# Patient Record
Sex: FEMALE | Race: WHITE | Employment: OTHER | URBAN - METROPOLITAN AREA
[De-identification: names, ages, dates, MRNs, and addresses within clinical notes are randomized per-mention and may not be internally consistent; named-entity substitution may affect disease eponyms.]

---

## 2017-02-17 ENCOUNTER — CARE COORDINATION (OUTPATIENT)
Dept: ONCOLOGY | Facility: CLINIC | Age: 62
End: 2017-02-17

## 2017-02-17 NOTE — PROGRESS NOTES
"Yancy called about the pain in her left leg. She states today it feels good. Not bothering her. She feels that it was from over compensating from \"babying\" her right knee that is bad due to arthritis. Gets steroid injections to this knee, is in need of another one. Denies any pain, swelling or redness at this time. Started using her cane again last night which she feels also helped. She will call over the weekend if the symptoms reoccur. Has the resource number to call if needed.   "

## 2017-02-21 ENCOUNTER — TRANSFERRED RECORDS (OUTPATIENT)
Dept: HEALTH INFORMATION MANAGEMENT | Facility: CLINIC | Age: 62
End: 2017-02-21

## 2017-02-22 ENCOUNTER — TELEPHONE (OUTPATIENT)
Dept: ONCOLOGY | Facility: CLINIC | Age: 62
End: 2017-02-22

## 2017-02-22 NOTE — TELEPHONE ENCOUNTER
Clinic Action Needed:Yes, please call patient  Reason for Call:Patient calling reporting she found a lump under left arm yesterday. Reporting area was scanned yesterday at [facility].  Patient is requesting that Dr Voss review the results.     Routed to:Dr Voss Nurse Evans Kilgore, RN  [facility]

## 2017-02-28 ENCOUNTER — TELEPHONE (OUTPATIENT)
Dept: ONCOLOGY | Facility: CLINIC | Age: 62
End: 2017-02-28

## 2017-02-28 ENCOUNTER — ONCOLOGY VISIT (OUTPATIENT)
Dept: ONCOLOGY | Facility: CLINIC | Age: 62
End: 2017-02-28
Attending: PHYSICIAN ASSISTANT
Payer: COMMERCIAL

## 2017-02-28 ENCOUNTER — APPOINTMENT (OUTPATIENT)
Dept: LAB | Facility: CLINIC | Age: 62
End: 2017-02-28
Attending: INTERNAL MEDICINE
Payer: COMMERCIAL

## 2017-02-28 VITALS
BODY MASS INDEX: 33.95 KG/M2 | SYSTOLIC BLOOD PRESSURE: 154 MMHG | DIASTOLIC BLOOD PRESSURE: 90 MMHG | OXYGEN SATURATION: 95 % | HEIGHT: 63 IN | WEIGHT: 191.6 LBS | RESPIRATION RATE: 16 BRPM | TEMPERATURE: 98.5 F | HEART RATE: 95 BPM

## 2017-02-28 DIAGNOSIS — L73.9 FOLLICULITIS: Primary | ICD-10-CM

## 2017-02-28 DIAGNOSIS — C85.90 NHL (NON-HODGKIN'S LYMPHOMA) (H): ICD-10-CM

## 2017-02-28 DIAGNOSIS — C85.90 NHL (NON-HODGKIN'S LYMPHOMA) (H): Primary | ICD-10-CM

## 2017-02-28 LAB
ALBUMIN SERPL-MCNC: 4.2 G/DL (ref 3.4–5)
ALP SERPL-CCNC: 104 U/L (ref 40–150)
ALT SERPL W P-5'-P-CCNC: 37 U/L (ref 0–50)
ANION GAP SERPL CALCULATED.3IONS-SCNC: 9 MMOL/L (ref 3–14)
AST SERPL W P-5'-P-CCNC: 27 U/L (ref 0–45)
BASOPHILS # BLD AUTO: 0 10E9/L (ref 0–0.2)
BASOPHILS NFR BLD AUTO: 0.5 %
BILIRUB SERPL-MCNC: 0.7 MG/DL (ref 0.2–1.3)
BUN SERPL-MCNC: 12 MG/DL (ref 7–30)
CALCIUM SERPL-MCNC: 9.2 MG/DL (ref 8.5–10.1)
CHLORIDE SERPL-SCNC: 100 MMOL/L (ref 94–109)
CO2 SERPL-SCNC: 28 MMOL/L (ref 20–32)
CREAT SERPL-MCNC: 0.72 MG/DL (ref 0.52–1.04)
DIFFERENTIAL METHOD BLD: NORMAL
EOSINOPHIL # BLD AUTO: 0.1 10E9/L (ref 0–0.7)
EOSINOPHIL NFR BLD AUTO: 1 %
ERYTHROCYTE [DISTWIDTH] IN BLOOD BY AUTOMATED COUNT: 12.3 % (ref 10–15)
GFR SERPL CREATININE-BSD FRML MDRD: 81 ML/MIN/1.7M2
GLUCOSE SERPL-MCNC: 113 MG/DL (ref 70–99)
GRAM STN SPEC: NORMAL
HCT VFR BLD AUTO: 40.9 % (ref 35–47)
HGB BLD-MCNC: 13.8 G/DL (ref 11.7–15.7)
IMM GRANULOCYTES # BLD: 0 10E9/L (ref 0–0.4)
IMM GRANULOCYTES NFR BLD: 0.3 %
LDH SERPL L TO P-CCNC: 202 U/L (ref 81–234)
LYMPHOCYTES # BLD AUTO: 1.4 10E9/L (ref 0.8–5.3)
LYMPHOCYTES NFR BLD AUTO: 22.2 %
Lab: NORMAL
MCH RBC QN AUTO: 30.9 PG (ref 26.5–33)
MCHC RBC AUTO-ENTMCNC: 33.7 G/DL (ref 31.5–36.5)
MCV RBC AUTO: 92 FL (ref 78–100)
MICRO REPORT STATUS: NORMAL
MONOCYTES # BLD AUTO: 0.4 10E9/L (ref 0–1.3)
MONOCYTES NFR BLD AUTO: 5.8 %
NEUTROPHILS # BLD AUTO: 4.4 10E9/L (ref 1.6–8.3)
NEUTROPHILS NFR BLD AUTO: 70.2 %
NRBC # BLD AUTO: 0 10*3/UL
NRBC BLD AUTO-RTO: 0 /100
PLATELET # BLD AUTO: 187 10E9/L (ref 150–450)
POTASSIUM SERPL-SCNC: 3.8 MMOL/L (ref 3.4–5.3)
PROT SERPL-MCNC: 7.7 G/DL (ref 6.8–8.8)
RBC # BLD AUTO: 4.46 10E12/L (ref 3.8–5.2)
SODIUM SERPL-SCNC: 137 MMOL/L (ref 133–144)
SPECIMEN SOURCE: NORMAL
WBC # BLD AUTO: 6.3 10E9/L (ref 4–11)

## 2017-02-28 PROCEDURE — 87205 SMEAR GRAM STAIN: CPT | Performed by: PHYSICIAN ASSISTANT

## 2017-02-28 PROCEDURE — 80053 COMPREHEN METABOLIC PANEL: CPT | Performed by: PHYSICIAN ASSISTANT

## 2017-02-28 PROCEDURE — 36415 COLL VENOUS BLD VENIPUNCTURE: CPT | Performed by: PHYSICIAN ASSISTANT

## 2017-02-28 PROCEDURE — 85025 COMPLETE CBC W/AUTO DIFF WBC: CPT | Performed by: PHYSICIAN ASSISTANT

## 2017-02-28 PROCEDURE — 87077 CULTURE AEROBIC IDENTIFY: CPT | Performed by: PHYSICIAN ASSISTANT

## 2017-02-28 PROCEDURE — 87070 CULTURE OTHR SPECIMN AEROBIC: CPT | Performed by: PHYSICIAN ASSISTANT

## 2017-02-28 PROCEDURE — 87186 SC STD MICRODIL/AGAR DIL: CPT | Performed by: PHYSICIAN ASSISTANT

## 2017-02-28 PROCEDURE — 99212 OFFICE O/P EST SF 10 MIN: CPT | Mod: ZF

## 2017-02-28 PROCEDURE — 83615 LACTATE (LD) (LDH) ENZYME: CPT | Performed by: PHYSICIAN ASSISTANT

## 2017-02-28 PROCEDURE — 99214 OFFICE O/P EST MOD 30 MIN: CPT | Mod: ZP | Performed by: PHYSICIAN ASSISTANT

## 2017-02-28 RX ORDER — HYDROCHLOROTHIAZIDE 12.5 MG/1
12.5 CAPSULE ORAL
COMMUNITY
Start: 2017-01-14

## 2017-02-28 RX ORDER — CIMETIDINE 800 MG
800 TABLET ORAL
COMMUNITY
Start: 2015-12-07

## 2017-02-28 RX ORDER — SULFAMETHOXAZOLE/TRIMETHOPRIM 800-160 MG
1 TABLET ORAL 2 TIMES DAILY
Qty: 14 TABLET | Refills: 0 | Status: SHIPPED | OUTPATIENT
Start: 2017-02-28 | End: 2017-03-07

## 2017-02-28 RX ORDER — ROSUVASTATIN CALCIUM 10 MG/1
10 TABLET, COATED ORAL
COMMUNITY
Start: 2017-01-14

## 2017-02-28 RX ORDER — TRAMADOL HYDROCHLORIDE 50 MG/1
50 TABLET ORAL
COMMUNITY
Start: 2016-12-30

## 2017-02-28 RX ORDER — LEVOTHYROXINE SODIUM 112 UG/1
112 TABLET ORAL
COMMUNITY
Start: 2017-01-14

## 2017-02-28 RX ORDER — AMOXICILLIN 500 MG/1
500 CAPSULE ORAL
COMMUNITY
Start: 2014-08-12

## 2017-02-28 RX ORDER — CYANOCOBALAMIN 1000 UG/ML
1000 INJECTION, SOLUTION INTRAMUSCULAR; SUBCUTANEOUS
COMMUNITY
Start: 2015-03-23

## 2017-02-28 ASSESSMENT — PAIN SCALES - GENERAL: PAINLEVEL: NO PAIN (0)

## 2017-02-28 NOTE — PROGRESS NOTES
Chief Complaint   Patient presents with     Blood Draw     lab draw in right arm, vitals taken      Estefania Beyer CMA

## 2017-02-28 NOTE — NURSING NOTE
"Yancy Jacosb is a 61 year old female who presents for:  Chief Complaint   Patient presents with     Blood Draw     lab draw in right arm, vitals taken      Oncology Clinic Visit     return patient for follow up visit related to NHL (non-Hodgkin's lymphoma) (H)        Initial Vitals:  /90 (BP Location: Right arm, Patient Position: Chair, Cuff Size: Adult Large)  Pulse 95  Temp 98.5  F (36.9  C) (Oral)  Resp 16  Ht 1.6 m (5' 3\")  Wt 86.9 kg (191 lb 9.6 oz)  SpO2 95%  BMI 33.94 kg/m2 Estimated body mass index is 33.94 kg/(m^2) as calculated from the following:    Height as of this encounter: 1.6 m (5' 3\").    Weight as of this encounter: 86.9 kg (191 lb 9.6 oz).. Body surface area is 1.97 meters squared. BP completed using cuff size: NA (Not Taken)  No Pain (0) No LMP recorded. Allergies and medications reviewed.     Medications: Medication refills not needed today.  Pharmacy name entered into EPIC: [facility] #[id number] - [place] [address]    Comments: patient denied pain/discomfort. Patient is very anxious.     5 minutes for nursing intake (face to face time)   Mary Stanford CMA        "

## 2017-02-28 NOTE — PROGRESS NOTES
Oncology/Hematology Visit Note  Feb 28, 2017    Reason for Visit: follow up of follicular lymphoma, evaluate left axillary mass    History of Present Illness: Yancy Jacobs is a 61 year old female with follicular lymphoma.  She was treated with 4 weekly doses of Rituxan in June 2016. Subsequent imaging in July 2016 showed marked interval decrease with near resolution of FDG uptake in the multiple hypermetabolic osseous lesions. CT CAP on 11/8/16 showed no evidence of recurrent disease.  Please see previous notes for further details on the patient's history. She comes in today for evaluation of a left axillary mass.    Interval History:  Patient reports that she noticed a left axillary lump on 2/19. She saw her PCP and reports that she had a black head removed. Due to persistent bump, she had an ultrasound, which she notes had a small lymph node. She was picking at it yesterday and noted pus. She reports a history of MRSA and was treated for an infection in May 2016 with clindamycin. She denies any fevers, chills, night sweats, other lumps or bumps. She has had a poor appetite for the past 2 days due to concern about the potential for lymphoma recurrence. She denies other concerns.     Current Outpatient Prescriptions   Medication Sig Dispense Refill     cyanocobalamin (VITAMIN B12) 1000 MCG/ML injection Inject 1,000 mcg into the muscle       hydrochlorothiazide (MICROZIDE) 12.5 MG capsule Take 12.5 mg by mouth       levothyroxine (SYNTHROID/LEVOTHROID) 112 MCG tablet Take 112 mcg by mouth       rosuvastatin (CRESTOR) 10 MG tablet Take 10 mg by mouth       traMADol (ULTRAM) 50 MG tablet Take 50 mg by mouth       sulfamethoxazole-trimethoprim (BACTRIM DS/SEPTRA DS) 800-160 MG per tablet Take 1 tablet by mouth 2 times daily for 7 days 14 tablet 0     amoxicillin (AMOXIL) 500 MG capsule 500 mg Reported on 2/28/2017       cimetidine (TAGAMET) 800 MG tablet Take 800 mg by mouth Reported on 2/28/2017        "[DISCONTINUED] hydrochlorothiazide (MICROZIDE) 12.5 MG capsule Take 12.5 mg by mouth       Physical Examination:  General: The patient is a pleasant female in no acute distress. She appears moderately anxious.  /90 (BP Location: Right arm, Patient Position: Chair, Cuff Size: Adult Large)  Pulse 95  Temp 98.5  F (36.9  C) (Oral)  Resp 16  Ht 1.6 m (5' 3\")  Wt 86.9 kg (191 lb 9.6 oz)  SpO2 95%  BMI 33.94 kg/m2  Wt Readings from Last 10 Encounters:   02/28/17 86.9 kg (191 lb 9.6 oz)   11/17/16 83.1 kg (183 lb 4.8 oz)   06/24/16 81.1 kg (178 lb 12.8 oz)   06/17/16 82 kg (180 lb 11.2 oz)   06/10/16 80.9 kg (178 lb 4.8 oz)   06/03/16 81 kg (178 lb 9.6 oz)   05/11/16 81.2 kg (179 lb 1.6 oz)   05/05/16 81.6 kg (180 lb)   04/22/16 82 kg (180 lb 12.4 oz)   04/04/16 80.7 kg (177 lb 14.4 oz)   HEENT: EOMI. Sclerae are anicteric.   Lymph: Neck is supple with no lymphadenopathy in the cervical or supraclavicular areas. 6 mm left axillary lump palpated, mildly tender with associated mild amount of white drainage. No other left axillary adenopathy. No right axillary adenopathy palpable. No palpable inguinal adenopathy.   Heart: Regular rate and rhythm.   Lungs: Clear to auscultation bilaterally.   Abdomen: Bowel sounds present, soft, nontender with no palpable hepatosplenomegaly or masses.   Extremities: No lower extremity edema noted bilaterally.   Neuro: Cranial nerves II through XII are grossly intact.  Skin: As above. No rashes, petechiae, or bruising noted on exposed skin.    Laboratory Data:  Results for orders placed or performed in visit on 02/28/17 (from the past 24 hour(s))   Comprehensive metabolic panel   Result Value Ref Range    Sodium 137 133 - 144 mmol/L    Potassium 3.8 3.4 - 5.3 mmol/L    Chloride 100 94 - 109 mmol/L    Carbon Dioxide 28 20 - 32 mmol/L    Anion Gap 9 3 - 14 mmol/L    Glucose 113 (H) 70 - 99 mg/dL    Urea Nitrogen 12 7 - 30 mg/dL    Creatinine 0.72 0.52 - 1.04 mg/dL    GFR Estimate 81 >60 " mL/min/1.7m2    GFR Estimate If Black >90   GFR Calc   >60 mL/min/1.7m2    Calcium 9.2 8.5 - 10.1 mg/dL    Bilirubin Total 0.7 0.2 - 1.3 mg/dL    Albumin 4.2 3.4 - 5.0 g/dL    Protein Total 7.7 6.8 - 8.8 g/dL    Alkaline Phosphatase 104 40 - 150 U/L    ALT 37 0 - 50 U/L    AST 27 0 - 45 U/L   Lactate Dehydrogenase   Result Value Ref Range    Lactate Dehydrogenase 202 81 - 234 U/L   *CBC with platelets differential   Result Value Ref Range    WBC 6.3 4.0 - 11.0 10e9/L    RBC Count 4.46 3.8 - 5.2 10e12/L    Hemoglobin 13.8 11.7 - 15.7 g/dL    Hematocrit 40.9 35.0 - 47.0 %    MCV 92 78 - 100 fl    MCH 30.9 26.5 - 33.0 pg    MCHC 33.7 31.5 - 36.5 g/dL    RDW 12.3 10.0 - 15.0 %    Platelet Count 187 150 - 450 10e9/L    Diff Method Automated Method     % Neutrophils 70.2 %    % Lymphocytes 22.2 %    % Monocytes 5.8 %    % Eosinophils 1.0 %    % Basophils 0.5 %    % Immature Granulocytes 0.3 %    Nucleated RBCs 0 0 /100    Absolute Neutrophil 4.4 1.6 - 8.3 10e9/L    Absolute Lymphocytes 1.4 0.8 - 5.3 10e9/L    Absolute Monocytes 0.4 0.0 - 1.3 10e9/L    Absolute Eosinophils 0.1 0.0 - 0.7 10e9/L    Absolute Basophils 0.0 0.0 - 0.2 10e9/L    Abs Immature Granulocytes 0.0 0 - 0.4 10e9/L    Absolute Nucleated RBC 0.0      Assessment and Plan:  1. Folliculitis. Lesion in left axilla is most consistent with a folliculitis. Small amount of drainage was collected for a gram stain and culture. Given history of MRSA, will treat with Bactrim, as she felt she did not tolerate the clindamycin well.     2. Follicular lymphoma. No evidence of recurrence on H&P. Will f/u with Dr. Voss as scheduled the end of May with a CT CAP. She will call sooner for concerns.     Elissa Villarreal PA-C  [facility]  [address] [place] [address] [phone]    Addendum: Outside left axillary u/s on 2/21/17 shows the following: 4 x 3 x 2 mm hypoechoic lesion.

## 2017-02-28 NOTE — MR AVS SNAPSHOT
After Visit Summary   2/28/2017    Yancy Jacobs    MRN: [id number]           Patient Information     Date Of Birth          1955        Visit Information        Provider Department      2/28/2017 11:10 AM Elissa Villarreal PA-C [facility]        Today's Diagnoses     Folliculitis    -  1    NHL (non-Hodgkin's lymphoma) (H)           Follow-ups after your visit        Your next 10 appointments already scheduled     May 23, 2017  9:20 AM CDT   (Arrive by 9:05 AM)   CT FEMUR BILATERAL W CONTRAST with UCCT2   [facility] CT ([facility])    [address]  1st Floor  [facility] [address] [phone]           Please bring any scans or X-rays taken at other hospitals, if similar tests were done. Also bring a list of your medicines, including vitamins, minerals and over-the-counter drugs. It is safest to leave personal items at home.  Be sure to tell your doctor:   If you have any allergies.   If there s any chance you are pregnant.   If you are breastfeeding.   If you have any special needs.  You will have contrast for this exam. To prepare:   Do not eat or drink for 2 hours before your exam. If you need to take medicine, you may take it with small sips of water. (We may ask you to take liquid medicine as well.)   The day before your exam, drink extra fluids at least six 8-ounce glasses (unless your doctor tells you to restrict your fluids).  Patients over 70 or patients with diabetes or kidney problems:   If you haven t had a blood test (creatinine test) within the last 30 days, go to your clinic or Diagnostic Imaging Department for this test.  If you have diabetes:   If your kidney function is normal, continue taking your metformin (Avandamet, Glucophage, Glucovance, Metaglip) on the day of your exam.   If your kidney function is abnormal, wait 48 hours before restarting this medicine.  Please wear loose clothing, such as a  sweat suit or jogging clothes. Avoid snaps, zippers and other metal. We may ask you to undress and put on a hospital gown.  If you have any questions, please call the Imaging Department where you will have your exam.            May 23, 2017  9:40 AM CDT   (Arrive by 9:25 AM)   CT CHEST/ABDOMEN/PELVIS W CONTRAST with UCCT2   [facility] CT ([facility])    [address]  1st Floor  [facility] [address] [phone]           Please bring any scans or X-rays taken at other hospitals, if similar tests were done. Also bring a list of your medicines, including vitamins, minerals and over-the-counter drugs. It is safest to leave personal items at home.  Be sure to tell your doctor:   If you have any allergies.   If there s any chance you are pregnant.   If you are breastfeeding.   If you have any special needs.  You may have contrast for this exam. To prepare:   Do not eat or drink for 2 hours before your exam. If you need to take medicine, you may take it with small sips of water. (We may ask you to take liquid medicine as well.)   The day before your exam, drink extra fluids at least six 8-ounce glasses (unless your doctor tells you to restrict your fluids).  Patients over 70 or patients with diabetes or kidney problems:   If you haven t had a blood test (creatinine test) within the last 30 days, go to your clinic or Diagnostic Imaging Department for this test.  If you have diabetes:   If your kidney function is normal, continue taking your metformin (Avandamet, Glucophage, Glucovance, Metaglip) on the day of your exam.   If your kidney function is abnormal, wait 48 hours before restarting this medicine.  You will have oral contrast for this exam:   You will drink the contrast at home. Get this from your clinic or Diagnostic Imaging Department. Please follow the directions given.  Please wear loose clothing, such as a sweat suit or jogging clothes. Avoid snaps, zippers  "and other metal. We may ask you to undress and put on a hospital gown.  If you have any questions, please call the Imaging Department where you will have your exam.            May 25, 2017  9:30 AM CDT   RETURN ONC with Jorge Voss MD   [facility] ([facility])    [address]  2nd Floor  [facility] [address] [phone]              Who to contact     If you have questions or need follow up information about today's clinic visit or your schedule please contact [facility] directly at [phone].  Normal or non-critical lab and imaging results will be communicated to you by MyChart, letter or phone within 4 business days after the clinic has received the results. If you do not hear from us within 7 days, please contact the clinic through [employer]fer[employer] or phone. If you have a critical or abnormal lab result, we will notify you by phone as soon as possible.  Submit refill requests through Evino or call your pharmacy and they will forward the refill request to us. Please allow 3 business days for your refill to be completed.          Additional Information About Your Visit        Evino Information     Evino gives you secure access to your electronic health record. If you see a primary care provider, you can also send messages to your care team and make appointments. If you have questions, please call your primary care clinic.  If you do not have a primary care provider, please call [phone] and they will assist you.        Care EveryWhere ID     This is your Care EveryWhere ID. This could be used by other organizations to access your [facility] medical records  [id number]        Your Vitals Were     Pulse Temperature Respirations Height Pulse Oximetry BMI (Body Mass Index)    95 98.5  F (36.9  C) (Oral) 16 1.6 m (5' 3\") 95% 33.94 kg/m2       Blood Pressure from Last 3 Encounters:   02/28/17 154/90   11/17/16 152/81 "   08/11/16 148/89    Weight from Last 3 Encounters:   02/28/17 86.9 kg (191 lb 9.6 oz)   11/17/16 83.1 kg (183 lb 4.8 oz)   06/24/16 81.1 kg (178 lb 12.8 oz)              We Performed the Following     *CBC with platelets differential     Comprehensive metabolic panel     Gram stain     Lactate Dehydrogenase     Wound Culture Aerobic Bacterial          Today's Medication Changes          These changes are accurate as of: 2/28/17 12:10 PM.  If you have any questions, ask your nurse or doctor.               Start taking these medicines.        Dose/Directions    sulfamethoxazole-trimethoprim 800-160 MG per tablet   Commonly known as:  BACTRIM DS/SEPTRA DS   Used for:  Folliculitis   Started by:  Elissa Villarreal PA-C        Dose:  1 tablet   Take 1 tablet by mouth 2 times daily for 7 days   Quantity:  14 tablet   Refills:  0         These medicines have changed or have updated prescriptions.        Dose/Directions    amoxicillin 500 MG capsule   Commonly known as:  AMOXIL   This may have changed:  Another medication with the same name was removed. Continue taking this medication, and follow the directions you see here.   Changed by:  Elissa Villarreal PA-C        Dose:  500 mg   500 mg Reported on 2/28/2017   Refills:  0       cimetidine 800 MG tablet   Commonly known as:  TAGAMET   This may have changed:  Another medication with the same name was removed. Continue taking this medication, and follow the directions you see here.   Changed by:  Elissa Villarreal PA-C        Dose:  800 mg   Take 800 mg by mouth Reported on 2/28/2017   Refills:  0       cyanocobalamin 1000 MCG/ML injection   Commonly known as:  VITAMIN B12   This may have changed:  Another medication with the same name was removed. Continue taking this medication, and follow the directions you see here.   Changed by:  Elissa Villarreal PA-C        Dose:  1000 mcg   Inject 1,000 mcg into the muscle   Refills:  0       hydrochlorothiazide 12.5  MG capsule   Commonly known as:  MICROZIDE   This may have changed:  Another medication with the same name was removed. Continue taking this medication, and follow the directions you see here.   Changed by:  Elissa Villarreal PA-C        Dose:  12.5 mg   Take 12.5 mg by mouth   Refills:  0       levothyroxine 112 MCG tablet   Commonly known as:  SYNTHROID/LEVOTHROID   This may have changed:  Another medication with the same name was removed. Continue taking this medication, and follow the directions you see here.   Changed by:  Elissa Villarreal PA-C        Dose:  112 mcg   Take 112 mcg by mouth   Refills:  0       rosuvastatin 10 MG tablet   Commonly known as:  CRESTOR   This may have changed:  Another medication with the same name was removed. Continue taking this medication, and follow the directions you see here.   Changed by:  Elissa Villarreal PA-C        Dose:  10 mg   Take 10 mg by mouth   Refills:  0       traMADol 50 MG tablet   Commonly known as:  ULTRAM   This may have changed:  Another medication with the same name was removed. Continue taking this medication, and follow the directions you see here.   Changed by:  Elissa Villarreal PA-C        Dose:  50 mg   Take 50 mg by mouth   Refills:  0            Where to get your medicines      These medications were sent to [facility] #[id number] - [place], [place] - [address]  [address] [place] [id number]    Hours:  [facility] [place] - numbers unchanged   9/8/03  Phone:  [phone]     sulfamethoxazole-trimethoprim 800-160 MG per tablet                Primary Care Provider Office Phone # Fax #    Misti Starr [phone]       [facility] [address]  [facility] [address]        Thank you!     Thank you for choosing [facility]  for your care. Our goal is always to provide you with excellent care. Hearing back from our patients is one way we can continue to improve our services.  Please take a few minutes to complete the written survey that you may receive in the mail after your visit with us. Thank you!             Your Updated Medication List - Protect others around you: Learn how to safely use, store and throw away your medicines at www.disposemymeds.org.          This list is accurate as of: 2/28/17 12:10 PM.  Always use your most recent med list.                   Brand Name Dispense Instructions for use    amoxicillin 500 MG capsule    AMOXIL     500 mg Reported on 2/28/2017       cimetidine 800 MG tablet    TAGAMET     Take 800 mg by mouth Reported on 2/28/2017       cyanocobalamin 1000 MCG/ML injection    VITAMIN B12     Inject 1,000 mcg into the muscle       hydrochlorothiazide 12.5 MG capsule    MICROZIDE     Take 12.5 mg by mouth       levothyroxine 112 MCG tablet    SYNTHROID/LEVOTHROID     Take 112 mcg by mouth       rosuvastatin 10 MG tablet    CRESTOR     Take 10 mg by mouth       sulfamethoxazole-trimethoprim 800-160 MG per tablet    BACTRIM DS/SEPTRA DS    14 tablet    Take 1 tablet by mouth 2 times daily for 7 days       traMADol 50 MG tablet    ULTRAM     Take 50 mg by mouth

## 2017-03-01 ENCOUNTER — MYC MEDICAL ADVICE (OUTPATIENT)
Dept: ONCOLOGY | Facility: CLINIC | Age: 62
End: 2017-03-01

## 2017-03-02 LAB
BACTERIA SPEC CULT: ABNORMAL
Lab: ABNORMAL
MICRO REPORT STATUS: ABNORMAL
MICROORGANISM SPEC CULT: ABNORMAL
SPECIMEN SOURCE: ABNORMAL

## 2017-05-23 DIAGNOSIS — K57.92 ACUTE DIVERTICULITIS: Primary | ICD-10-CM

## 2017-05-23 RX ORDER — CIPROFLOXACIN 500 MG/1
500 TABLET, FILM COATED ORAL 2 TIMES DAILY
Qty: 14 TABLET | Refills: 0 | Status: SHIPPED | OUTPATIENT
Start: 2017-05-23 | End: 2017-12-21

## 2017-05-23 RX ORDER — METRONIDAZOLE 500 MG/1
500 TABLET ORAL 3 TIMES DAILY
Qty: 21 TABLET | Refills: 0 | Status: SHIPPED | OUTPATIENT
Start: 2017-05-23 | End: 2017-12-21

## 2017-05-25 ENCOUNTER — OFFICE VISIT (OUTPATIENT)
Dept: TRANSPLANT | Facility: CLINIC | Age: 62
End: 2017-05-25
Attending: INTERNAL MEDICINE
Payer: COMMERCIAL

## 2017-05-25 VITALS
OXYGEN SATURATION: 98 % | SYSTOLIC BLOOD PRESSURE: 149 MMHG | DIASTOLIC BLOOD PRESSURE: 99 MMHG | HEART RATE: 67 BPM | TEMPERATURE: 98 F | RESPIRATION RATE: 18 BRPM

## 2017-05-25 DIAGNOSIS — C82.99 FOLLICULAR LYMPHOMA OF EXTRANODAL SITE EXCLUDING SPLEEN AND OTHER SOLID ORGANS, UNSPECIFIED FOLLICULAR LYMPHOMA TYPE (H): Primary | ICD-10-CM

## 2017-05-25 DIAGNOSIS — C82.99 FOLLICULAR LYMPHOMA OF EXTRANODAL SITE EXCLUDING SPLEEN AND OTHER SOLID ORGANS, UNSPECIFIED FOLLICULAR LYMPHOMA TYPE (H): ICD-10-CM

## 2017-05-25 LAB
ALBUMIN SERPL-MCNC: 3.6 G/DL (ref 3.4–5)
ALP SERPL-CCNC: 96 U/L (ref 40–150)
ALT SERPL W P-5'-P-CCNC: 36 U/L (ref 0–50)
ANION GAP SERPL CALCULATED.3IONS-SCNC: 10 MMOL/L (ref 3–14)
AST SERPL W P-5'-P-CCNC: 34 U/L (ref 0–45)
BASOPHILS # BLD AUTO: 0 10E9/L (ref 0–0.2)
BASOPHILS NFR BLD AUTO: 0.4 %
BILIRUB SERPL-MCNC: 0.6 MG/DL (ref 0.2–1.3)
BUN SERPL-MCNC: 8 MG/DL (ref 7–30)
CALCIUM SERPL-MCNC: 9.2 MG/DL (ref 8.5–10.1)
CHLORIDE SERPL-SCNC: 104 MMOL/L (ref 94–109)
CO2 SERPL-SCNC: 28 MMOL/L (ref 20–32)
CREAT SERPL-MCNC: 0.68 MG/DL (ref 0.52–1.04)
DIFFERENTIAL METHOD BLD: ABNORMAL
EOSINOPHIL # BLD AUTO: 0.1 10E9/L (ref 0–0.7)
EOSINOPHIL NFR BLD AUTO: 1.7 %
ERYTHROCYTE [DISTWIDTH] IN BLOOD BY AUTOMATED COUNT: 12.5 % (ref 10–15)
GFR SERPL CREATININE-BSD FRML MDRD: 87 ML/MIN/1.7M2
GLUCOSE SERPL-MCNC: 104 MG/DL (ref 70–99)
HCT VFR BLD AUTO: 35.8 % (ref 35–47)
HGB BLD-MCNC: 12 G/DL (ref 11.7–15.7)
IMM GRANULOCYTES # BLD: 0 10E9/L (ref 0–0.4)
IMM GRANULOCYTES NFR BLD: 0.4 %
LDH SERPL L TO P-CCNC: 190 U/L (ref 81–234)
LYMPHOCYTES # BLD AUTO: 1.5 10E9/L (ref 0.8–5.3)
LYMPHOCYTES NFR BLD AUTO: 28.1 %
MCH RBC QN AUTO: 31.7 PG (ref 26.5–33)
MCHC RBC AUTO-ENTMCNC: 33.5 G/DL (ref 31.5–36.5)
MCV RBC AUTO: 95 FL (ref 78–100)
MONOCYTES # BLD AUTO: 0.4 10E9/L (ref 0–1.3)
MONOCYTES NFR BLD AUTO: 8.2 %
NEUTROPHILS # BLD AUTO: 3.3 10E9/L (ref 1.6–8.3)
NEUTROPHILS NFR BLD AUTO: 61.2 %
NRBC # BLD AUTO: 0 10*3/UL
NRBC BLD AUTO-RTO: 0 /100
PLATELET # BLD AUTO: 174 10E9/L (ref 150–450)
POTASSIUM SERPL-SCNC: 4 MMOL/L (ref 3.4–5.3)
PROT SERPL-MCNC: 6.8 G/DL (ref 6.8–8.8)
RBC # BLD AUTO: 3.79 10E12/L (ref 3.8–5.2)
SODIUM SERPL-SCNC: 142 MMOL/L (ref 133–144)
WBC # BLD AUTO: 5.3 10E9/L (ref 4–11)

## 2017-05-25 PROCEDURE — 80053 COMPREHEN METABOLIC PANEL: CPT | Performed by: INTERNAL MEDICINE

## 2017-05-25 PROCEDURE — 36415 COLL VENOUS BLD VENIPUNCTURE: CPT | Performed by: INTERNAL MEDICINE

## 2017-05-25 PROCEDURE — 85025 COMPLETE CBC W/AUTO DIFF WBC: CPT | Performed by: INTERNAL MEDICINE

## 2017-05-25 PROCEDURE — 99212 OFFICE O/P EST SF 10 MIN: CPT

## 2017-05-25 PROCEDURE — 83615 LACTATE (LD) (LDH) ENZYME: CPT | Performed by: INTERNAL MEDICINE

## 2017-05-25 NOTE — NURSING NOTE
"Oncology Rooming Note    May 25, 2017 9:44 AM   Yancy Jacobs is a 62 year old female who presents for:    Chief Complaint   Patient presents with     Oncology Clinic Visit     Pt with NHL--here for a MD visit     Initial Vitals: BP (!) 149/99 (BP Location: Right arm, Patient Position: Right side, Cuff Size: Adult Large)  Pulse 67  Temp 98  F (36.7  C) (Oral)  Resp 18  SpO2 98% Estimated body mass index is 33.94 kg/(m^2) as calculated from the following:    Height as of 2/28/17: 1.6 m (5' 3\").    Weight as of 2/28/17: 86.9 kg (191 lb 9.6 oz). There is no height or weight on file to calculate BSA.  Data Unavailable Comment: Data Unavailable   No LMP recorded.  Allergies reviewed: Yes  Medications reviewed: Yes    Medications: Medication refills not needed today.  Pharmacy name entered into EPIC: [facility] #[id number] - [address]    Clinical concerns: Pt had a episode of Acute Diverticulitis this last week. She hd a fever and went in to her MD at home. Then called Dr. Voss who put her on some abx.      6 minutes for nursing intake (face to face time)     Anita Reynolds MA              "

## 2017-05-25 NOTE — PROGRESS NOTES
REASON FOR VISIT:  Followup for history of follicular lymphoma with bony involvement.      HISTORY OF PRESENT ILLNESS/REVIEW OF SYSTEMS:  Ms. 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  She underwent diagnostic PET/CT scan in 05/2016, which demonstrated multiple axial and appendicular hypermetabolic marrow lesions consistent with her underlying lymphoma.  The patient was treated with 4 weekly infusions of rituximab and has remained progression-free with no constitutional symptoms or any concerns for disease progression.  She has been having chronic pain in her knees associated with arthritis for which she has been following up with the Orthopedic team with periodic injections of steroids into the knee with temporary relief.  Her recent clinical course was complicated by acute diverticulitis with high-grade fevers and left lower quadrant abdominal pain.  The patient was seen and evaluated by her primary care physician.  However, no antibiotics were prescribed.  I reviewed with the patient the results of her recent CT imaging that was obtained for the purposes of surveillance for her underlying lymphoma, which demonstrated acute diverticulitis.  Given these imaging findings and her clinical symptoms, I recommended initiation of empiric antibiotic therapy with Cipro and Flagyl of 1 week duration.      The patient has been taking these antibiotics for the past couple of days.  I also called and informed the patient on the results of her imaging evaluation and my recommendation to consider antibiotic therapy for her acute diverticulitis.      She reports overall improvement with no ongoing pain in her abdomen.  No fevers, chills or drenching night sweats.  She continues to work full-time.  Apart from  the pain in her knee, she denies any other symptoms such as weight loss, drenching night sweats and she is not aware about any lumps throughout her body.      The rest of 12 points of ROS were reviewed and found to be negative, unless as mentioned above.      Pertinent points of her interval medical history were reviewed during this visit and addressed as outlined below.  I also reviewed and reconciled her meds.      PHYSICAL EXAMINATION:   VITAL SIGNS:  Reviewed in Epic and found to be notable for a slight elevation in both systolic and diastolic blood pressures.   GENERAL:  Not in acute distress, alert and oriented, well-nourished and hydrated.   HEENT:  Moist mucous membranes with no ulcerations or thrush.  Pupils are equally round and reactive to light.  No eythema or jaundice.   NECK:  No palpable masses.   PULMONARY:  Clear to auscultation bilaterally.   CARDIOVASCULAR:  Regular rate and rhythm, no murmurs, rubs or gallops.   LYMPHATICS:  No palpable lymph nodes in the axilla, armpits, groin.   ABDOMEN:  Soft, nontender, nondistended, no palpable hepatosplenomegaly.  Audible bowel sounds and no pain in the lower quadrants, even on deep palpation.   EXTREMITIES:  No lower extremity edema.   SKIN:  No rashes.   NEUROLOGIC:  Grossly nonfocal with normal gait and balance.      LABORATORY DATA:  Reviewed in Epic and overall stable compared to last visit.  Specifically, WBC is 5.3, hemoglobin 12.0 and platelets 174.      ASSESSMENT AND PLAN:  This is a very pleasant 62-year-old female patient with a prior history of germinal center type B-cell non-Hodgkin lymphoma most consistent with follicular lymphoma with involvement of the bony skeleton treated with 4 weekly infusions of rituximab, who has remained in remission with no evidence of disease progression, presenting for her followup evaluation.      Lymphoma:  We reviewed with the patient her interval progress and result of her recent CT scans, which demonstrated  no evidence of disease progression (including no new lymphadenopathy and no new bony lesions).  There has been also no signs of disease progression based on her physical examination and lab findings so far.  I was certainly concerned with her development of acute diverticulitis with high-grade fevers and abdominal pain; however, the patient has been now treated for this successfully with Cipro and Flagyl for a total course of a week.  The patient will follow up with her primary care physician with the rest of her health maintenance issues and I will see her back in 3 months with repeat labs and physical examination in regards to her lymphoma.  Multiple questions were asked and answered during this visit.  I recommend the patient to pursue a fiber-rich diet, and she voiced understanding and agreement with this plan.      I spent over 50% of close to a 40-minute encounter in counseling and care coordination, reviewing her Interval medical history and formulating ongoing plan of care as outlined above.       Jorge Voss MD PhD  Hematology, Oncology and Transplantation  [facility]    ------------------------------------------------------------------------------------------------------------------  This note was created with the use of a speech recognition software occasionally resulting in unintended misspelling errors despite my best efforts to detect and correct those.

## 2017-05-25 NOTE — LETTER
5/25/2017       RE: Yancy Jacobs  [address] [address]     Dear Colleague,    Thank you for referring your patient, Yancy Jacobs, to the [facility] BLOOD AND MARROW TRANSPLANT at [facility]. Please see a copy of my visit note below.    REASON FOR VISIT:  Followup for history of follicular lymphoma with bony involvement.      HISTORY OF PRESENT ILLNESS/REVIEW OF SYSTEMS:  Ms. 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  She underwent diagnostic PET/CT scan in 05/2016, which demonstrated multiple axial and appendicular hypermetabolic marrow lesions consistent with her underlying lymphoma.  The patient was treated with 4 weekly infusions of rituximab and has remained progression-free with no constitutional symptoms or any concerns for disease progression.  She has been having chronic pain in her knees associated with arthritis for which she has been following up with the Orthopedic team with periodic injections of steroids into the knee with temporary relief.  Her recent clinical course was complicated by acute diverticulitis with high-grade fevers and left lower quadrant abdominal pain.  The patient was seen and evaluated by her primary care physician.  However, no antibiotics were prescribed.  I reviewed with the patient the results of her recent CT imaging that was obtained for the purposes of surveillance for her underlying lymphoma, which demonstrated acute diverticulitis.  Given these imaging findings and her clinical symptoms, I recommended initiation of empiric antibiotic therapy with Cipro and Flagyl of 1 week duration.      The patient has been taking these antibiotics for the past couple of days.  I also called and informed  the patient on the results of her imaging evaluation and my recommendation to consider antibiotic therapy for her acute diverticulitis.      She reports overall improvement with no ongoing pain in her abdomen.  No fevers, chills or drenching night sweats.  She continues to work full-time.  Apart from the pain in her knee, she denies any other symptoms such as weight loss, drenching night sweats and she is not aware about any lumps throughout her body.      The rest of 12 points of ROS were reviewed and found to be negative, unless as mentioned above.      Pertinent points of her interval medical history were reviewed during this visit and addressed as outlined below.  I also reviewed and reconciled her meds.      PHYSICAL EXAMINATION:   VITAL SIGNS:  Reviewed in Epic and found to be notable for a slight elevation in both systolic and diastolic blood pressures.   GENERAL:  Not in acute distress, alert and oriented, well-nourished and hydrated.   HEENT:  Moist mucous membranes with no ulcerations or thrush.  Pupils are equally round and reactive to light.  No eythema or jaundice.   NECK:  No palpable masses.   PULMONARY:  Clear to auscultation bilaterally.   CARDIOVASCULAR:  Regular rate and rhythm, no murmurs, rubs or gallops.   LYMPHATICS:  No palpable lymph nodes in the axilla, armpits, groin.   ABDOMEN:  Soft, nontender, nondistended, no palpable hepatosplenomegaly.  Audible bowel sounds and no pain in the lower quadrants, even on deep palpation.   EXTREMITIES:  No lower extremity edema.   SKIN:  No rashes.   NEUROLOGIC:  Grossly nonfocal with normal gait and balance.      LABORATORY DATA:  Reviewed in Epic and overall stable compared to last visit.  Specifically, WBC is 5.3, hemoglobin 12.0 and platelets 174.      ASSESSMENT AND PLAN:  This is a very pleasant 62-year-old female patient with a prior history of germinal center type B-cell non-Hodgkin lymphoma most consistent with follicular lymphoma with  involvement of the bony skeleton treated with 4 weekly infusions of rituximab, who has remained in remission with no evidence of disease progression, presenting for her followup evaluation.      Lymphoma:  We reviewed with the patient her interval progress and result of her recent CT scans, which demonstrated no evidence of disease progression (including no new lymphadenopathy and no new bony lesions).  There has been also no signs of disease progression based on her physical examination and lab findings so far.  I was certainly concerned with her development of acute diverticulitis with high-grade fevers and abdominal pain; however, the patient has been now treated for this successfully with Cipro and Flagyl for a total course of a week.  The patient will follow up with her primary care physician with the rest of her health maintenance issues and I will see her back in 3 months with repeat labs and physical examination in regards to her lymphoma.  Multiple questions were asked and answered during this visit.  I recommend the patient to pursue a fiber-rich diet, and she voiced understanding and agreement with this plan.      I spent over 50% of close to a 40-minute encounter in counseling and care coordination, reviewing her Interval medical history and formulating ongoing plan of care as outlined above.       Jorge Voss MD PhD  Hematology, Oncology and Transplantation  [facility]    ------------------------------------------------------------------------------------------------------------------  This note was created with the use of a speech recognition software occasionally resulting in unintended misspelling errors despite my best efforts to detect and correct those.       Again, thank you for allowing me to participate in the care of your patient.      Sincerely,    Jorge Voss MD

## 2017-05-25 NOTE — MR AVS SNAPSHOT
After Visit Summary   5/25/2017    Yancy Jacobs    MRN: [id number]           Patient Information     Date Of Birth          1955        Visit Information        Provider Department      5/25/2017 9:30 AM Jorge Voss MD [facility] Blood and Marrow Transplant        Today's Diagnoses     Follicular lymphoma of extranodal site excluding spleen and other solid organs, unspecified follicular lymphoma type (H)    -  1          [facility] ([facility])  [address] [place] [id number]  Phone: [phone]  Clinic Hours:   Monday-Thursday: 7am to 7pm   Friday: 7am to 5:30pm   Weekends and holidays:    8am to noon (in general)  If your fever is 100.5  or greater,   call the clinic.  After hours call the   hospital at [phone] or   [phone]. Ask for the BMT   fellow on-call            Follow-ups after your visit        Your next 10 appointments already scheduled     Aug 24, 2017  9:00 AM CDT   [facility] with  [facility]   [facility] ([facility])    [address] [place] [id number] [phone]            Aug 24, 2017  9:30 AM CDT   RETURN ONC with Jorge Voss MD   [facility] Blood and Marrow Transplant ([facility])    [address] [place] [id number] [phone]              Who to contact     If you have questions or need follow up information about today's clinic visit or your schedule please contact [facility] BLOOD AND MARROW TRANSPLANT directly at [phone].  Normal or non-critical lab and imaging results will be communicated to you by MyChart, letter or phone within 4 business days after the clinic has received the results. If you do not hear from us within 7 days, please contact the clinic through MyChart or phone. If you have a critical or abnormal lab result, we will notify you by phone as soon as possible.  Submit  refill requests through [employer] or call your pharmacy and they will forward the refill request to us. Please allow 3 business days for your refill to be completed.          Additional Information About Your Visit        [employer] Information     [employer] gives you secure access to your electronic health record. If you see a primary care provider, you can also send messages to your care team and make appointments. If you have questions, please call your primary care clinic.  If you do not have a primary care provider, please call [phone] and they will assist you.        Care EveryWhere ID     This is your Care EveryWhere ID. This could be used by other organizations to access your [facility] medical records  [id number]        Your Vitals Were     Pulse Temperature Respirations Pulse Oximetry          67 98  F (36.7  C) (Oral) 18 98%         Blood Pressure from Last 3 Encounters:   05/25/17 (!) 149/99   02/28/17 154/90   11/17/16 152/81    Weight from Last 3 Encounters:   02/28/17 86.9 kg (191 lb 9.6 oz)   11/17/16 83.1 kg (183 lb 4.8 oz)   06/24/16 81.1 kg (178 lb 12.8 oz)              Today, you had the following     No orders found for display       Recent Review Flowsheet Data     BMT Recent Results Latest Ref Rng & Units 6/24/2016 7/22/2016 8/11/2016 11/8/2016 11/8/2016 2/28/2017 5/25/2017    WBC 4.0 - 11.0 10e9/L 5.9 - 6.3 - 5.9 6.3 5.3    Hemoglobin 11.7 - 15.7 g/dL 12.4 - 12.5 - 13.2 13.8 12.0    Platelet Count 150 - 450 10e9/L 184 - 171 - 189 187 174    Neutrophils (Absolute) 1.6 - 8.3 10e9/L 3.2 - 3.7 - 3.4 4.4 3.3    Sodium 133 - 144 mmol/L - - 142 - 141 137 142    Potassium 3.4 - 5.3 mmol/L - - 3.9 - 4.2 3.8 4.0    Chloride 94 - 109 mmol/L - - 104 - 104 100 104    Glucose 70 - 99 mg/dL - 103(H) 94 - 106(H) 113(H) 104(H)    Urea Nitrogen 7 - 30 mg/dL - - 13 - 12 12 8    Creatinine 0.52 - 1.04 mg/dL - 0.8 0.73 0.68 0.7 0.72 0.68    Calcium (Total) 8.5 - 10.1 mg/dL - - 9.5 - 9.0 9.2 9.2    Protein (Total)  6.8 - 8.8 g/dL - - 7.4 - 7.3 7.7 6.8    Albumin 3.4 - 5.0 g/dL - - 4.3 - 3.8 4.2 3.6    Alkaline Phosphatase 40 - 150 U/L - - 100 - 102 104 96    AST 0 - 45 U/L - - 25 - 20 27 34    ALT 0 - 50 U/L - - 33 - 34 37 36    MCV 78 - 100 fl 91 - 91 - 93 92 95               Primary Care Provider Office Phone # Fax #    Misti Starr, RONNY [phone]       [facility] [place] [address] [place] [address]        Thank you!     Thank you for choosing [facility] BLOOD AND MARROW TRANSPLANT  for your care. Our goal is always to provide you with excellent care. Hearing back from our patients is one way we can continue to improve our services. Please take a few minutes to complete the written survey that you may receive in the mail after your visit with us. Thank you!             Your Updated Medication List - Protect others around you: Learn how to safely use, store and throw away your medicines at www.disposemymeds.org.          This list is accurate as of: 5/25/17 11:59 PM.  Always use your most recent med list.                   Brand Name Dispense Instructions for use    amoxicillin 500 MG capsule    AMOXIL     500 mg Reported on 2/28/2017       cimetidine 800 MG tablet    TAGAMET     Take 800 mg by mouth Reported on 2/28/2017       ciprofloxacin 500 MG tablet    CIPRO    14 tablet    Take 1 tablet (500 mg) by mouth 2 times daily       cyanocobalamin 1000 MCG/ML injection    VITAMIN B12     Inject 1,000 mcg into the muscle       hydrochlorothiazide 12.5 MG capsule    MICROZIDE     Take 12.5 mg by mouth       levothyroxine 112 MCG tablet    SYNTHROID/LEVOTHROID     Take 112 mcg by mouth       metroNIDAZOLE 500 MG tablet    FLAGYL    21 tablet    Take 1 tablet (500 mg) by mouth 3 times daily       rosuvastatin 10 MG tablet    CRESTOR     Take 10 mg by mouth       traMADol 50 MG tablet    ULTRAM     Take 50 mg by mouth

## 2017-08-24 ENCOUNTER — OFFICE VISIT (OUTPATIENT)
Dept: TRANSPLANT | Facility: CLINIC | Age: 62
End: 2017-08-24
Attending: INTERNAL MEDICINE
Payer: COMMERCIAL

## 2017-08-24 ENCOUNTER — APPOINTMENT (OUTPATIENT)
Dept: LAB | Facility: CLINIC | Age: 62
End: 2017-08-24
Attending: INTERNAL MEDICINE
Payer: COMMERCIAL

## 2017-08-24 VITALS
RESPIRATION RATE: 16 BRPM | HEART RATE: 78 BPM | HEIGHT: 63 IN | DIASTOLIC BLOOD PRESSURE: 87 MMHG | OXYGEN SATURATION: 98 % | SYSTOLIC BLOOD PRESSURE: 153 MMHG | TEMPERATURE: 97.7 F

## 2017-08-24 DIAGNOSIS — C82.99 FOLLICULAR LYMPHOMA OF EXTRANODAL SITE EXCLUDING SPLEEN AND OTHER SOLID ORGANS, UNSPECIFIED FOLLICULAR LYMPHOMA TYPE (H): ICD-10-CM

## 2017-08-24 LAB
ALBUMIN SERPL-MCNC: 3.9 G/DL (ref 3.4–5)
ALP SERPL-CCNC: 111 U/L (ref 40–150)
ALT SERPL W P-5'-P-CCNC: 33 U/L (ref 0–50)
ANION GAP SERPL CALCULATED.3IONS-SCNC: 7 MMOL/L (ref 3–14)
AST SERPL W P-5'-P-CCNC: 18 U/L (ref 0–45)
BASOPHILS # BLD AUTO: 0 10E9/L (ref 0–0.2)
BASOPHILS NFR BLD AUTO: 0.5 %
BILIRUB SERPL-MCNC: 0.5 MG/DL (ref 0.2–1.3)
BUN SERPL-MCNC: 14 MG/DL (ref 7–30)
CALCIUM SERPL-MCNC: 8.9 MG/DL (ref 8.5–10.1)
CHLORIDE SERPL-SCNC: 104 MMOL/L (ref 94–109)
CO2 SERPL-SCNC: 29 MMOL/L (ref 20–32)
CREAT SERPL-MCNC: 0.84 MG/DL (ref 0.52–1.04)
DIFFERENTIAL METHOD BLD: NORMAL
EOSINOPHIL # BLD AUTO: 0.1 10E9/L (ref 0–0.7)
EOSINOPHIL NFR BLD AUTO: 1.9 %
ERYTHROCYTE [DISTWIDTH] IN BLOOD BY AUTOMATED COUNT: 13 % (ref 10–15)
GFR SERPL CREATININE-BSD FRML MDRD: 68 ML/MIN/1.7M2
GLUCOSE SERPL-MCNC: 94 MG/DL (ref 70–99)
HCT VFR BLD AUTO: 40.5 % (ref 35–47)
HGB BLD-MCNC: 13.4 G/DL (ref 11.7–15.7)
IMM GRANULOCYTES # BLD: 0 10E9/L (ref 0–0.4)
IMM GRANULOCYTES NFR BLD: 0.3 %
LDH SERPL L TO P-CCNC: 171 U/L (ref 81–234)
LYMPHOCYTES # BLD AUTO: 2 10E9/L (ref 0.8–5.3)
LYMPHOCYTES NFR BLD AUTO: 31.4 %
MCH RBC QN AUTO: 30.9 PG (ref 26.5–33)
MCHC RBC AUTO-ENTMCNC: 33.1 G/DL (ref 31.5–36.5)
MCV RBC AUTO: 94 FL (ref 78–100)
MONOCYTES # BLD AUTO: 0.5 10E9/L (ref 0–1.3)
MONOCYTES NFR BLD AUTO: 7.4 %
NEUTROPHILS # BLD AUTO: 3.7 10E9/L (ref 1.6–8.3)
NEUTROPHILS NFR BLD AUTO: 58.5 %
NRBC # BLD AUTO: 0 10*3/UL
NRBC BLD AUTO-RTO: 0 /100
PLATELET # BLD AUTO: 168 10E9/L (ref 150–450)
POTASSIUM SERPL-SCNC: 3.9 MMOL/L (ref 3.4–5.3)
PROT SERPL-MCNC: 7.5 G/DL (ref 6.8–8.8)
RBC # BLD AUTO: 4.33 10E12/L (ref 3.8–5.2)
SODIUM SERPL-SCNC: 140 MMOL/L (ref 133–144)
WBC # BLD AUTO: 6.3 10E9/L (ref 4–11)

## 2017-08-24 PROCEDURE — 85025 COMPLETE CBC W/AUTO DIFF WBC: CPT | Performed by: INTERNAL MEDICINE

## 2017-08-24 PROCEDURE — 99212 OFFICE O/P EST SF 10 MIN: CPT

## 2017-08-24 PROCEDURE — 36415 COLL VENOUS BLD VENIPUNCTURE: CPT

## 2017-08-24 PROCEDURE — 83615 LACTATE (LD) (LDH) ENZYME: CPT | Performed by: INTERNAL MEDICINE

## 2017-08-24 PROCEDURE — 80053 COMPREHEN METABOLIC PANEL: CPT | Performed by: INTERNAL MEDICINE

## 2017-08-24 ASSESSMENT — PAIN SCALES - GENERAL: PAINLEVEL: NO PAIN (0)

## 2017-08-24 NOTE — MR AVS SNAPSHOT
After Visit Summary   8/24/2017    Yancy Jacobs    MRN: [id number]           Patient Information     Date Of Birth          1955        Visit Information        Provider Department      8/24/2017 9:30 AM Jorge Voss MD [facility] Blood and Marrow Transplant        Today's Diagnoses     Follicular lymphoma of extranodal site excluding spleen and other solid organs, unspecified follicular lymphoma type (H)              [facility] ([facility])  [address] [place] [address]  Phone: [phone]  Clinic Hours:   Monday-Thursday:7am to 7pm   Friday: 7am to 5pm   Weekends and holidays:    8am to noon (in general)  If your fever is 100.5  or greater,   call the clinic.  After hours call the   hospital at [phone] or   [phone]. Ask for the BMT   fellow on-call            Follow-ups after your visit        Your next 10 appointments already scheduled     Dec 12, 2017  9:30 AM CST   [facility] with  [facility]   [facility] Draw ([facility])    [address] [place] [id number] [phone]            Dec 12, 2017 10:00 AM CST   (Arrive by 9:45 AM)   CT FEMUR BILATERAL W CONTRAST with UCCT2   [facility] Imaging [place] [facility] ([facility])    [address] [place] [id number] [phone]           Please bring any scans or X-rays taken at other hospitals, if similar tests were done. Also bring a list of your medicines, including vitamins, minerals and over-the-counter drugs. It is safest to leave personal items at home.  Be sure to tell your doctor:   If you have any allergies.   If there s any chance you are pregnant.   If you are breastfeeding.   If you have any special needs.  You will have contrast for this exam. To prepare:   Do not eat or drink for 2 hours before your exam. If you need to take medicine, you may take it with small sips of  water. (We may ask you to take liquid medicine as well.)   The day before your exam, drink extra fluids at least six 8-ounce glasses (unless your doctor tells you to restrict your fluids).  Patients over 70 or patients with diabetes or kidney problems:   If you haven t had a blood test (creatinine test) within the last 30 days, go to your clinic or Diagnostic Imaging Department for this test.  If you have diabetes:   If your kidney function is normal, continue taking your metformin (Avandamet, Glucophage, Glucovance, Metaglip) on the day of your exam.   If your kidney function is abnormal, wait 48 hours before restarting this medicine.  Please wear loose clothing, such as a sweat suit or jogging clothes. Avoid snaps, zippers and other metal. We may ask you to undress and put on a hospital gown.  If you have any questions, please call the Imaging Department where you will have your exam.            Dec 12, 2017 10:20 AM CST   (Arrive by 10:05 AM)   CT CHEST/ABDOMEN/PELVIS W CONTRAST with UCCT2   [facility] CT ([facility])    [address] [address] [phone]           Please bring any scans or X-rays taken at other hospitals, if similar tests were done. Also bring a list of your medicines, including vitamins, minerals and over-the-counter drugs. It is safest to leave personal items at home.  Be sure to tell your doctor:   If you have any allergies.   If there s any chance you are pregnant.   If you are breastfeeding.   If you have any special needs.  You may have contrast for this exam. To prepare:   Do not eat or drink for 2 hours before your exam. If you need to take medicine, you may take it with small sips of water. (We may ask you to take liquid medicine as well.)   The day before your exam, drink extra fluids at least six 8-ounce glasses (unless your doctor tells you to restrict your fluids).  Patients over 70 or patients with  diabetes or kidney problems:   If you haven t had a blood test (creatinine test) within the last 30 days, go to your clinic or Diagnostic Imaging Department for this test.  If you have diabetes:   If your kidney function is normal, continue taking your metformin (Avandamet, Glucophage, Glucovance, Metaglip) on the day of your exam.   If your kidney function is abnormal, wait 48 hours before restarting this medicine.  You will have oral contrast for this exam:   You will drink the contrast at home. Get this from your clinic or Diagnostic Imaging Department. Please follow the directions given.  Please wear loose clothing, such as a sweat suit or jogging clothes. Avoid snaps, zippers and other metal. We may ask you to undress and put on a hospital gown.  If you have any questions, please call the Imaging Department where you will have your exam.            Dec 21, 2017  9:30 AM CST   RETURN ONC with Jorge Voss MD   [facility] Blood and Marrow Transplant ([facility])    [address] [place] [id number] [phone]              Future tests that were ordered for you today     Open Future Orders        Priority Expected Expires Ordered    CT Chest/Abdomen/Pelvis w Contrast Routine 12/12/2017 1/8/2018 8/24/2017    CT Femur bilateral w Contrast Routine 12/12/2017 1/24/2018 8/24/2017            Who to contact     If you have questions or need follow up information about today's clinic visit or your schedule please contact [facility] BLOOD AND MARROW TRANSPLANT directly at [phone].  Normal or non-critical lab and imaging results will be communicated to you by MyChart, letter or phone within 4 business days after the clinic has received the results. If you do not hear from us within 7 days, please contact the clinic through MyChart or phone. If you have a critical or abnormal lab result, we will notify you by phone as soon as possible.  Submit refill requests  "through [employer] or call your pharmacy and they will forward the refill request to us. Please allow 3 business days for your refill to be completed.          Additional Information About Your Visit        the [facility] Information     [employer] gives you secure access to your electronic health record. If you see a primary care provider, you can also send messages to your care team and make appointments. If you have questions, please call your primary care clinic.  If you do not have a primary care provider, please call [phone] and they will assist you.        Care EveryWhere ID     This is your Care EveryWhere ID. This could be used by other organizations to access your [facility] medical records  [id number]        Your Vitals Were     Pulse Temperature Respirations Height Pulse Oximetry       78 97.7  F (36.5  C) (Oral) 16 1.6 m (5' 2.99\") 98%        Blood Pressure from Last 3 Encounters:   08/24/17 153/87   05/25/17 (!) 149/99   02/28/17 154/90    Weight from Last 3 Encounters:   02/28/17 86.9 kg (191 lb 9.6 oz)   11/17/16 83.1 kg (183 lb 4.8 oz)   06/24/16 81.1 kg (178 lb 12.8 oz)              We Performed the Following     CBC with platelets differential     Comprehensive metabolic panel     Lactate Dehydrogenase        Recent Review Flowsheet Data     BMT Recent Results Latest Ref Rng & Units 7/22/2016 8/11/2016 11/8/2016 11/8/2016 2/28/2017 5/25/2017 8/24/2017    WBC 4.0 - 11.0 10e9/L - 6.3 - 5.9 6.3 5.3 6.3    Hemoglobin 11.7 - 15.7 g/dL - 12.5 - 13.2 13.8 12.0 13.4    Platelet Count 150 - 450 10e9/L - 171 - 189 187 174 168    Neutrophils (Absolute) 1.6 - 8.3 10e9/L - 3.7 - 3.4 4.4 3.3 3.7    Sodium 133 - 144 mmol/L - 142 - 141 137 142 140    Potassium 3.4 - 5.3 mmol/L - 3.9 - 4.2 3.8 4.0 3.9    Chloride 94 - 109 mmol/L - 104 - 104 100 104 104    Glucose 70 - 99 mg/dL 103(H) 94 - 106(H) 113(H) 104(H) 94    Urea Nitrogen 7 - 30 mg/dL - 13 - 12 12 8 14    Creatinine 0.52 - 1.04 mg/dL 0.8 0.73 0.68 0.7 0.72 0.68 " 0.84    Calcium (Total) 8.5 - 10.1 mg/dL - 9.5 - 9.0 9.2 9.2 8.9    Protein (Total) 6.8 - 8.8 g/dL - 7.4 - 7.3 7.7 6.8 7.5    Albumin 3.4 - 5.0 g/dL - 4.3 - 3.8 4.2 3.6 3.9    Alkaline Phosphatase 40 - 150 U/L - 100 - 102 104 96 111    AST 0 - 45 U/L - 25 - 20 27 34 18    ALT 0 - 50 U/L - 33 - 34 37 36 33    MCV 78 - 100 fl - 91 - 93 92 95 94               Primary Care Provider Office Phone # Fax #    Misti Matty, RONNY [phone]       [facility] [address] [address] [place] [address]        Equal Access to Services     JUSTINO PINO : Hadii angie seth Soemanuel, waaxda luqadaha, qaybta kaalmada victor manuel, zack field . So [facility] [phone].    ATENCIÓN: Si habla español, tiene a parker disposición servicios gratuitos de asistencia lingüística. Brianna al [phone].    We comply with applicable federal civil rights laws and [place] laws. We do not discriminate on the basis of race, color, national origin, age, disability sex, sexual orientation or gender identity.            Thank you!     Thank you for choosing [facility] BLOOD AND MARROW TRANSPLANT  for your care. Our goal is always to provide you with excellent care. Hearing back from our patients is one way we can continue to improve our services. Please take a few minutes to complete the written survey that you may receive in the mail after your visit with us. Thank you!             Your Updated Medication List - Protect others around you: Learn how to safely use, store and throw away your medicines at www.disposemymeds.org.          This list is accurate as of: 8/24/17 11:44 AM.  Always use your most recent med list.                   Brand Name Dispense Instructions for use Diagnosis    amoxicillin 500 MG capsule    AMOXIL     500 mg Reported on 2/28/2017        cimetidine 800 MG tablet    TAGAMET     Take 800 mg by mouth Reported on 2/28/2017        ciprofloxacin 500 MG tablet    CIPRO    14  tablet    Take 1 tablet (500 mg) by mouth 2 times daily    Acute diverticulitis       cyanocobalamin 1000 MCG/ML injection    VITAMIN B12     Inject 1,000 mcg into the muscle        hydrochlorothiazide 12.5 MG capsule    MICROZIDE     Take 12.5 mg by mouth        levothyroxine 112 MCG tablet    SYNTHROID/LEVOTHROID     Take 112 mcg by mouth        metroNIDAZOLE 500 MG tablet    FLAGYL    21 tablet    Take 1 tablet (500 mg) by mouth 3 times daily    Acute diverticulitis       rosuvastatin 10 MG tablet    CRESTOR     Take 10 mg by mouth        traMADol 50 MG tablet    ULTRAM     Take 50 mg by mouth

## 2017-08-24 NOTE — NURSING NOTE
"Oncology Rooming Note    August 24, 2017 9:28 AM   Yancy Jacobs is a 62 year old female who presents for:    Chief Complaint   Patient presents with     Blood Draw     Labs drawn from right arm in clinic by MA     Initial Vitals: /87  Pulse 78  Temp 97.7  F (36.5  C) (Oral)  Resp 16  Ht 1.6 m (5' 2.99\")  SpO2 98% Estimated body mass index is 33.94 kg/(m^2) as calculated from the following:    Height as of 2/28/17: 1.6 m (5' 3\").    Weight as of 2/28/17: 86.9 kg (191 lb 9.6 oz). There is no height or weight on file to calculate BSA.  No Pain (0) Comment: Data Unavailable   No LMP recorded.  Allergies reviewed: Yes  Medications reviewed: Yes    Medications: Medication refills not needed today.  Pharmacy name entered into EPIC: [facility] #[id number] - [address]    Clinical concerns: none     6 minutes for nursing intake (face to face time)     Anita Reynolds MA              "

## 2017-08-24 NOTE — PROGRESS NOTES
"REASON FOR VISIT:  Followup for history of follicular lymphoma with bony involvement.      HISTORY OF PRESENT ILLNESS/REVIEW OF SYSTEMS:  Ms. 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  She underwent diagnostic PET/CT scan in 05/2016, which demonstrated multiple axial and appendicular hypermetabolic marrow lesions consistent with her underlying lymphoma.  The patient was treated with 4 weekly infusions of rituximab and has remained progression-free with no constitutional symptoms or any concerns for disease progression.    Since her last visit with me she has continued to have occasional chronic pain in her knees associated with arthritis for which she has been following up with the Orthopedic team with periodic injections of steroids into the knee with temporary relief.  Her prior clinical course complicated by acute diverticulitis with high-grade fevers and left lower quadrant abdominal pain had completely resolved with abx.     She reported no recent fevers, chills or drenching night sweats.  She continues to work full-time.  she denies any weight loss, drenching night sweats and she is not aware about any lumps throughout her body. No new bony aches or pains.     The rest of 12 points of ROS were reviewed and found to be negative, unless as mentioned above.      Pertinent points of her interval medical history were reviewed during this visit and addressed as outlined below.  I also reviewed and reconciled her meds.      PHYSICAL EXAMINATION:   /87  Pulse 78  Temp 97.7  F (36.5  C) (Oral)  Resp 16  Ht 1.6 m (5' 2.99\")  SpO2 98%  GENERAL:  Not in acute distress, alert and oriented, well-nourished and hydrated.   HEENT:  Moist mucous membranes with no ulcerations or thrush.  Pupils " are equally round and reactive to light.  No eythema or jaundice.   NECK:  No palpable masses.   PULMONARY:  Clear to auscultation bilaterally.   CARDIOVASCULAR:  Regular rate and rhythm, no murmurs, rubs or gallops.   LYMPHATICS:  No palpable lymph nodes in the axilla, armpits, groin.   ABDOMEN:  Soft, nontender, nondistended, no palpable hepatosplenomegaly.  Audible bowel sounds and no pain in the lower quadrants, even on deep palpation.   EXTREMITIES:  No lower extremity edema.   SKIN:  No rashes.   MS: no painful bony prominences  NEUROLOGIC:  Grossly nonfocal with normal gait and balance.      LABORATORY DATA:  Reviewed in Epic  Hematology Studies   Recent Labs   Lab Test  08/24/17   0916  05/25/17   1030  02/28/17   1054  11/08/16   0846   WBC  6.3  5.3  6.3  5.9   ANEU  3.7  3.3  4.4  3.4   ALYM  2.0  1.5  1.4  1.8   JAYDEN  0.5  0.4  0.4  0.4   AEOS  0.1  0.1  0.1  0.1   HGB  13.4  12.0  13.8  13.2   HCT  40.5  35.8  40.9  39.6   PLT  168  174  187  189     LDH: wnl  CMP: wnl    ASSESSMENT AND PLAN:  This is a very pleasant 62-year-old female patient with a prior history of germinal center type B-cell non-Hodgkin lymphoma most consistent with follicular lymphoma with involvement of the bony skeleton treated with 4 weekly infusions of rituximab, who has remained in remission with no evidence of disease progression, presenting for her followup evaluation.      --Lymphoma:  We reviewed with the patient her interval progress and she appears to have no signs of disease progression based on her physical examination and lab findings. We will plan on seeing her in 3 mos with repeat imaging , labs and exam.   --Heme: no cytopenias or needs for transfusions  --ID: informed the pt about need to get flu vaccination in the Fall    I spent over 50% of close to a 25-minute of face to face encounter in counseling and care coordination, reviewing her Interval medical history and formulating ongoing plan of care as outlined  above.       Jorge Voss MD PhD  Hematology, Oncology and Transplantation  [facility]    ------------------------------------------------------------------------------------------------------------------  This note was created with the use of a speech recognition software occasionally resulting in unintended misspelling errors despite my best efforts to detect and correct those.

## 2017-08-24 NOTE — NURSING NOTE
Chief Complaint   Patient presents with     Blood Draw     Labs drawn from right arm in clinic by MA     Pt tolerated well  Mihaela Marcial MA

## 2017-12-12 DIAGNOSIS — C82.99 FOLLICULAR LYMPHOMA OF EXTRANODAL SITE EXCLUDING SPLEEN AND OTHER SOLID ORGANS, UNSPECIFIED FOLLICULAR LYMPHOMA TYPE (H): ICD-10-CM

## 2017-12-12 LAB
ALBUMIN SERPL-MCNC: 3.6 G/DL (ref 3.4–5)
ALP SERPL-CCNC: 123 U/L (ref 40–150)
ALT SERPL W P-5'-P-CCNC: 35 U/L (ref 0–50)
ANION GAP SERPL CALCULATED.3IONS-SCNC: 8 MMOL/L (ref 3–14)
AST SERPL W P-5'-P-CCNC: 22 U/L (ref 0–45)
BASOPHILS # BLD AUTO: 0 10E9/L (ref 0–0.2)
BASOPHILS NFR BLD AUTO: 0.3 %
BILIRUB SERPL-MCNC: 2.4 MG/DL (ref 0.2–1.3)
BUN SERPL-MCNC: 11 MG/DL (ref 7–30)
CALCIUM SERPL-MCNC: 9.2 MG/DL (ref 8.5–10.1)
CHLORIDE SERPL-SCNC: 102 MMOL/L (ref 94–109)
CO2 SERPL-SCNC: 30 MMOL/L (ref 20–32)
CREAT SERPL-MCNC: 0.66 MG/DL (ref 0.52–1.04)
DIFFERENTIAL METHOD BLD: NORMAL
EOSINOPHIL # BLD AUTO: 0.2 10E9/L (ref 0–0.7)
EOSINOPHIL NFR BLD AUTO: 3.3 %
ERYTHROCYTE [DISTWIDTH] IN BLOOD BY AUTOMATED COUNT: 12.3 % (ref 10–15)
GFR SERPL CREATININE-BSD FRML MDRD: >90 ML/MIN/1.7M2
GLUCOSE SERPL-MCNC: 104 MG/DL (ref 70–99)
HCT VFR BLD AUTO: 40.2 % (ref 35–47)
HGB BLD-MCNC: 13.1 G/DL (ref 11.7–15.7)
IMM GRANULOCYTES # BLD: 0 10E9/L (ref 0–0.4)
IMM GRANULOCYTES NFR BLD: 0.4 %
LDH SERPL L TO P-CCNC: 204 U/L (ref 81–234)
LYMPHOCYTES # BLD AUTO: 1.1 10E9/L (ref 0.8–5.3)
LYMPHOCYTES NFR BLD AUTO: 16.4 %
MCH RBC QN AUTO: 32.2 PG (ref 26.5–33)
MCHC RBC AUTO-ENTMCNC: 32.6 G/DL (ref 31.5–36.5)
MCV RBC AUTO: 99 FL (ref 78–100)
MONOCYTES # BLD AUTO: 0.3 10E9/L (ref 0–1.3)
MONOCYTES NFR BLD AUTO: 4.9 %
NEUTROPHILS # BLD AUTO: 5.1 10E9/L (ref 1.6–8.3)
NEUTROPHILS NFR BLD AUTO: 74.7 %
NRBC # BLD AUTO: 0 10*3/UL
NRBC BLD AUTO-RTO: 0 /100
PLATELET # BLD AUTO: 183 10E9/L (ref 150–450)
POTASSIUM SERPL-SCNC: 3.2 MMOL/L (ref 3.4–5.3)
PROT SERPL-MCNC: 7.5 G/DL (ref 6.8–8.8)
RBC # BLD AUTO: 4.07 10E12/L (ref 3.8–5.2)
SODIUM SERPL-SCNC: 140 MMOL/L (ref 133–144)
WBC # BLD AUTO: 6.8 10E9/L (ref 4–11)

## 2017-12-21 ENCOUNTER — OFFICE VISIT (OUTPATIENT)
Dept: TRANSPLANT | Facility: CLINIC | Age: 62
End: 2017-12-21
Attending: INTERNAL MEDICINE
Payer: COMMERCIAL

## 2017-12-21 VITALS
DIASTOLIC BLOOD PRESSURE: 75 MMHG | HEART RATE: 82 BPM | OXYGEN SATURATION: 98 % | RESPIRATION RATE: 18 BRPM | TEMPERATURE: 97.4 F | SYSTOLIC BLOOD PRESSURE: 156 MMHG

## 2017-12-21 DIAGNOSIS — N28.89 RENAL MASS: ICD-10-CM

## 2017-12-21 DIAGNOSIS — Z96.651 HISTORY OF TOTAL RIGHT KNEE REPLACEMENT: ICD-10-CM

## 2017-12-21 DIAGNOSIS — C82.58 DIFFUSE FOLLICLE CENTER LYMPHOMA OF LYMPH NODES OF MULTIPLE REGIONS (H): Primary | ICD-10-CM

## 2017-12-21 DIAGNOSIS — M17.4 OTHER SECONDARY OSTEOARTHRITIS OF BOTH KNEES: ICD-10-CM

## 2017-12-21 LAB
ALBUMIN SERPL-MCNC: 3.7 G/DL (ref 3.4–5)
ALP SERPL-CCNC: 106 U/L (ref 40–150)
ALT SERPL W P-5'-P-CCNC: 36 U/L (ref 0–50)
ANION GAP SERPL CALCULATED.3IONS-SCNC: 8 MMOL/L (ref 3–14)
AST SERPL W P-5'-P-CCNC: 26 U/L (ref 0–45)
BILIRUB SERPL-MCNC: 1.4 MG/DL (ref 0.2–1.3)
BUN SERPL-MCNC: 11 MG/DL (ref 7–30)
CALCIUM SERPL-MCNC: 9.1 MG/DL (ref 8.5–10.1)
CHLORIDE SERPL-SCNC: 105 MMOL/L (ref 94–109)
CO2 SERPL-SCNC: 28 MMOL/L (ref 20–32)
CREAT SERPL-MCNC: 0.65 MG/DL (ref 0.52–1.04)
GFR SERPL CREATININE-BSD FRML MDRD: >90 ML/MIN/1.7M2
GLUCOSE SERPL-MCNC: 96 MG/DL (ref 70–99)
POTASSIUM SERPL-SCNC: 4.2 MMOL/L (ref 3.4–5.3)
PROT SERPL-MCNC: 7 G/DL (ref 6.8–8.8)
SODIUM SERPL-SCNC: 141 MMOL/L (ref 133–144)

## 2017-12-21 PROCEDURE — 99213 OFFICE O/P EST LOW 20 MIN: CPT

## 2017-12-21 PROCEDURE — 80053 COMPREHEN METABOLIC PANEL: CPT | Performed by: INTERNAL MEDICINE

## 2017-12-21 ASSESSMENT — PAIN SCALES - GENERAL: PAINLEVEL: NO PAIN (0)

## 2017-12-21 NOTE — NURSING NOTE
"Oncology Rooming Note    December 21, 2017 10:26 AM   Yancy Jacobs is a 62 year old female who presents for:    Chief Complaint   Patient presents with     RECHECK     Patient with NHL here for provider     Initial Vitals: /75  Pulse 82  Temp 97.4  F (36.3  C) (Oral)  Resp 18  SpO2 98% Estimated body mass index is 33.94 kg/(m^2) as calculated from the following:    Height as of 2/28/17: 1.6 m (5' 3\").    Weight as of 2/28/17: 86.9 kg (191 lb 9.6 oz). There is no height or weight on file to calculate BSA.  No Pain (0) Comment: Data Unavailable   No LMP recorded.  Allergies reviewed: Yes  Medications reviewed: Yes    Medications: Medication refills not needed today.  Pharmacy name entered into EPIC: [facility] #[id number] - [address]    Clinical concerns: NA NA was NOT notified.    10 minutes for nursing intake (face to face time)     Janine Davies, BRITTNEY  Patient refused to do weight.            "

## 2017-12-21 NOTE — PROGRESS NOTES
REASON FOR VISIT:  Followup for history of follicular lymphoma with bony involvement.      HISTORY OF PRESENT ILLNESS AND REVIEW OF SYSTEMS:  Ms. 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  She had a diagnostic PET/CT scan in 05/2016 which demonstrated multiple axial and appendicular hypermetabolic marrow lesions consistent with her underlying lymphoma.  The patient was treated with 4 weekly infusions of Rituxan and has been so far progression-free.       The patient has been followed with observant management.  She presents today to discuss her interval progress.  She reported upper respiratory symptoms about 2 weeks ago for which she received empiric therapy with a Z-RADHA.  Her symptoms have completely resolved and she reports no ongoing fevers, chills or drenching night sweats.  She reports complete resolution of her cough.  Of note, she did receive the influenza vaccination this season.        She also has been noticing more frequent requirement of steroid injection in her right knee where she would experience pain and occasional popping sensation.  There have been episodes that she reported of being not able to walk, but certainly she had noticed improvement of those symptoms with steroid injections.  She has been following up with an orthopedic specialist.  Of note, her recent MRI of the right knee was done approximately a year ago.  She reports no abdominal symptoms short of prior periumbilical and left lower quadrant abdominal pain related to acute diverticulitis.      She has been going through a lot of stress in her family related to health issues of her son.  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  In total she has 3 kids.      Pertinent points of her interval medical history were otherwise  reviewed and found to be negative unless a mentioned above.      The rest of 12 points of ROS were negative as well.      PHYSICAL EXAMINATION:   VITAL SIGNS:  Reviewed in Epic and found to be acceptable except for a slightly increased systolic blood pressure of 156 on today's visit.   GENERAL:  Anxious but not in acute distress, well-nourished and hydrated.   HEENT:  Moist mucous membranes with no thrush.  Pupils equal, round and reactive to light.   PULMONARY:  Clear to auscultation bilaterally.   CARDIOVASCULAR:  Regular rate and rhythm.  No murmurs.   ABDOMEN:  Slightly obese but soft, nontender, nondistended.  No right upper quadrant tenderness.  Negative Delgado's sign.  Audible bowel sounds.   EXTREMITIES:  No lower extremity edema.   SKIN:  No rashes.   NEUROLOGIC:  Grossly nonfocal on limited exam.   MUSCULOSKELETAL:  Swollen right knee with tenderness surrounding the patella and also in the posterior knee.      LABORATORY:  We reviewed labs from 12/12/2017 which were notable for stable WBC, hemoglobin and platelets.  Slightly low potassium at 3.2 and elevated total bilirubin at 2.4.      IMAGING:  We reviewed with the patient in detail her recent CT scan findings which demonstrated no lymphadenopathy in the spine, chest, abdomen and pelvis.  A new complex renal mass was found that was not noticeable on prior scans.   Degenerative changes were observed in the right knee with a new 2-cm lucency area in the proximal right tibia/metaphysis.       ASSESSMENT AND PLAN:  This is a very pleasant 62-year-old female patient with a prior history of germinal center B-cell non-Hodgkin lymphoma most consistent with follicular lymphoma with involvement of the bony skeleton, treated with 6 weekly infusions of rituximab, presenting for a followup visit.       - Lymphoma:  We reviewed with the patient her interval progress.  She has had no evidence of disease progression based on her physical exam and lab findings consistent  with normal LDH.  I reviewed in detail the results of her recent CT evaluation which demonstrated no interval lymphadenopathy and was concerning for a 2-cm hypoattenuating area in her right upper tibia.  I explained to the patient that it is uncertain if this represents her prior follicular lymphoma and/or if this is related to her worsening knee pain.  I recommended proceeding with repeat MRI of her right knee, and she will contact her orthopedic physician to get this scheduled and evaluated.  Altogether, I explained to the patient that the burden of her disease is minimal and she is not symptomatic from a follicular lymphoma standpoint.  Therefore, no further treatment is indicated at the present time.  We also reviewed the results of the CT of her abdomen describing the interval discovery of a right kidney complex mass which will need to be followed up by Urology with subsequent MRI.  A referral was placed during this visit.  I also recommend repeating the CMP to assess for bilirubin elevation.  It is certainly possible that the elevated total bilirubin 2 weeks ago was related to her acute upper respiratory illness.  She has no evidence of acute cholecystitis on today's exam and her hemoglobin was stable and normal back at the time of bilirubin elevation, arguing against intravascular hemolysis.       She voiced understanding and agreement with this plan.  I also encouraged her to follow up with her primary care physician which would include followup of her potassium values, as her potassium was slightly lower back 2 weeks ago.       I spent altogether over 50% of this 40-minute encounter in counseling and care coordination, reviewing her interval progress, results of the CAT scans, and discussion of the plan of care as outlined above.       Jorge Voss MD PhD  Hematology, Oncology and Transplantation  [facility]  [place]    ------------------------------------------------------------------------------------------------------------------  This note was created with the use of a speech recognition software occasionally resulting in unintended misspelling errors despite my best efforts to detect and correct those.

## 2017-12-21 NOTE — MR AVS SNAPSHOT
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  After hours call the   hospital at [phone] or   [phone]. Ask for the BMT   fellow on-call            Follow-ups after your visit        Additional Services     UROLOGY ADULT REFERRAL       Your provider has referred you to: [facility]: [place] [facility] Urologic C[facility] - [place] [phone]   [email or web address]    Please be aware that coverage of these services is subject to the terms and limitations of your health insurance plan.  Call member services at your health plan with any benefit or coverage questions.      Please bring the following with you to your appointment:    (1) Any X-Rays, CTs or MRIs which have been performed.  Contact the facility where they were done to arrange for  prior to your scheduled appointment.    (2) List of current medications  (3) This referral request   (4) Any documents/labs given to you for this referral                  Your next 10 appointments already scheduled     Feb 17, 2018  9:00 AM CST   (Arrive by 8:45 AM)   New Kidney Mass with Viral Angel MD   [facility] Urology and [facility] for Prostate and Urologic Cancers ([facility])    [address]  4th [facility] [address] [phone]            Mar 29,  2018  8:30 AM CDT   LAB with  LAB   [facility] ([facility])    [address]  1st Floor  [facility] [address] [phone]           Please do not eat 10-12 hours before your appointment if you are coming in fasting for labs on lipids, cholesterol, or glucose (sugar). This does not apply to pregnant women. Water, hot tea and black coffee (with nothing added) are okay. Do not drink other fluids, diet soda or chew gum.            Mar 29, 2018  9:00 AM CDT   RETURN ONC with Jorge Voss MD   [facility] Blood and Marrow Transplant ([facility])    [address]  2nd Floor  [facility] [address] [phone]              Who to contact     If you have questions or need follow up information about today's clinic visit or your schedule please contact [facility] BLOOD AND MARROW TRANSPLANT directly at [phone].  Normal or non-critical lab and imaging results will be communicated to you by [employer], letter or phone within 4 business days after the clinic has received the results. If you do not hear from us within 7 days, please contact the clinic through IAMINTOIT or phone. If you have a critical or abnormal lab result, we will notify you by phone as soon as possible.  Submit refill requests through IAMINTOIT or call your pharmacy and they will forward the refill request to us. Please allow 3 business days for your refill to be completed.          Additional Information About Your Visit        IAMINTOIT Information     IAM[employer] gives you secure access to your electronic health record. If you see a primary care provider, you can also send messages to your care team and make appointments. If you have questions, please call your primary care clinic.  If you do not have a primary care provider, please call [phone] and they will assist you.        Care EveryWhere ID     This is your Care EveryWhere ID. This could be used by other organizations to  access your [facility] medical records  [id number]        Your Vitals Were     Pulse Temperature Respirations Pulse Oximetry          82 97.4  F (36.3  C) (Oral) 18 98%         Blood Pressure from Last 3 Encounters:   12/21/17 156/75   08/24/17 153/87   05/25/17 (!) 149/99    Weight from Last 3 Encounters:   02/28/17 86.9 kg (191 lb 9.6 oz)   11/17/16 83.1 kg (183 lb 4.8 oz)   06/24/16 81.1 kg (178 lb 12.8 oz)              We Performed the Following     Comprehensive metabolic panel     UROLOGY ADULT REFERRAL          Today's Medication Changes          These changes are accurate as of: 12/21/17 11:29 AM.  If you have any questions, ask your nurse or doctor.               Stop taking these medicines if you haven't already. Please contact your care team if you have questions.     ciprofloxacin 500 MG tablet   Commonly known as:  CIPRO           metroNIDAZOLE 500 MG tablet   Commonly known as:  FLAGYL                    Recent Review Flowsheet Data     BMT Recent Results Latest Ref Rng & Units 11/8/2016 11/8/2016 2/28/2017 5/25/2017 8/24/2017 12/12/2017 12/12/2017    WBC 4.0 - 11.0 10e9/L - 5.9 6.3 5.3 6.3 - 6.8    Hemoglobin 11.7 - 15.7 g/dL - 13.2 13.8 12.0 13.4 - 13.1    Platelet Count 150 - 450 10e9/L - 189 187 174 168 - 183    Neutrophils (Absolute) 1.6 - 8.3 10e9/L - 3.4 4.4 3.3 3.7 - 5.1    Sodium 133 - 144 mmol/L - 141 137 142 140 - 140    Potassium 3.4 - 5.3 mmol/L - 4.2 3.8 4.0 3.9 - 3.2(L)    Chloride 94 - 109 mmol/L - 104 100 104 104 - 102    Glucose 70 - 99 mg/dL - 106(H) 113(H) 104(H) 94 - 104(H)    Urea Nitrogen 7 - 30 mg/dL - 12 12 8 14 - 11    Creatinine 0.52 - 1.04 mg/dL 0.68 0.7 0.72 0.68 0.84 0.66 0.7    Calcium (Total) 8.5 - 10.1 mg/dL - 9.0 9.2 9.2 8.9 - 9.2    Protein (Total) 6.8 - 8.8 g/dL - 7.3 7.7 6.8 7.5 - 7.5    Albumin 3.4 - 5.0 g/dL - 3.8 4.2 3.6 3.9 - 3.6    Alkaline Phosphatase 40 - 150 U/L - 102 104 96 111 - 123    AST 0 - 45 U/L - 20 27 34 18 - 22    ALT 0 - 50 U/L - 34 37 36 33 - 35     MCV 78 - 100 fl - 93 92 95 94 - 99               Primary Care Provider Office Phone # Fax #    Misti Starr, RONNY [phone]       [facility] [place] [address] [address] [place] [address]        Equal Access to Services     JUSTINO PINO : Hadii angie leal hadcollinso Soomaali, waaxda luqadaha, qaybta kaalmada adeegyada, waxjose roberto reynolds pojane szymanskikierankasia prather. So [facility] [phone].    ATENCIÓN: Si habla español, tiene a parker disposición servicios gratuitos de asistencia lingüística. Brianna al [phone].    We comply with applicable federal civil rights laws and [place] laws. We do not discriminate on the basis of race, color, national origin, age, disability, sex, sexual orientation, or gender identity.            Thank you!     Thank you for choosing [facility] BLOOD AND MARROW TRANSPLANT  for your care. Our goal is always to provide you with excellent care. Hearing back from our patients is one way we can continue to improve our services. Please take a few minutes to complete the written survey that you may receive in the mail after your visit with us. Thank you!             Your Updated Medication List - Protect others around you: Learn how to safely use, store and throw away your medicines at www.disposemymeds.org.          This list is accurate as of: 12/21/17 11:29 AM.  Always use your most recent med list.                   Brand Name Dispense Instructions for use Diagnosis    amoxicillin 500 MG capsule    AMOXIL     500 mg Reported on 2/28/2017        cimetidine 800 MG tablet    TAGAMET     Take 800 mg by mouth Reported on 2/28/2017        cyanocobalamin 1000 MCG/ML injection    VITAMIN B12     Inject 1,000 mcg into the muscle        hydrochlorothiazide 12.5 MG capsule    MICROZIDE     Take 12.5 mg by mouth        levothyroxine 112 MCG tablet    SYNTHROID/LEVOTHROID     Take 112 mcg by mouth        rosuvastatin 10 MG tablet    CRESTOR     Take 10 mg by mouth        traMADol 50 MG  tablet    ULTRAM     Take 50 mg by mouth

## 2017-12-27 ENCOUNTER — PRE VISIT (OUTPATIENT)
Dept: UROLOGY | Facility: CLINIC | Age: 62
End: 2017-12-27

## 2018-03-12 ENCOUNTER — PRE VISIT (OUTPATIENT)
Dept: UROLOGY | Facility: CLINIC | Age: 63
End: 2018-03-12

## 2018-03-15 ENCOUNTER — OFFICE VISIT (OUTPATIENT)
Dept: UROLOGY | Facility: CLINIC | Age: 63
End: 2018-03-15
Payer: COMMERCIAL

## 2018-03-15 VITALS
SYSTOLIC BLOOD PRESSURE: 125 MMHG | HEIGHT: 63 IN | DIASTOLIC BLOOD PRESSURE: 85 MMHG | BODY MASS INDEX: 31.79 KG/M2 | WEIGHT: 179.4 LBS | HEART RATE: 89 BPM

## 2018-03-15 DIAGNOSIS — N28.89 RENAL MASS: Primary | ICD-10-CM

## 2018-03-15 RX ORDER — AMOXICILLIN 250 MG
2 CAPSULE ORAL
COMMUNITY
Start: 2018-02-14

## 2018-03-15 RX ORDER — OXYCODONE AND ACETAMINOPHEN 5; 325 MG/1; MG/1
1-2 TABLET ORAL
COMMUNITY
Start: 2018-02-26

## 2018-03-15 RX ORDER — CRUTCH
EACH MISCELLANEOUS
COMMUNITY
Start: 2018-02-14

## 2018-03-15 ASSESSMENT — PAIN SCALES - GENERAL: PAINLEVEL: NO PAIN (0)

## 2018-03-15 NOTE — PATIENT INSTRUCTIONS
Follow up with Dr. Angel in one year with imaging prior to appointment.    It was a pleasure meeting with you today.  Thank you for allowing me and my team the privilege of caring for you today.  YOU are the reason we are here, and I truly hope we provided you with the excellent service you deserve.  Please let us know if there is anything else we can do for you so that we can be sure you are leaving completely satisfied with your care experience.      MCKAY Walter

## 2018-03-15 NOTE — PROGRESS NOTES
Chief Complaint:   2 Renal Masses         Consult or Referral:     Mr. Yancy Jacobs is a 62 year old female seen in consultation from Dr. Voss.         History of Present Illness:    Yancy Jacobs is a very pleasant 62 year old female who presents with a history of a Renal Mass.  This was discovered incidentally in follow up on her lymphoma.  Initially diagnosed about 2 year(s) ago.  The mass is cystic ( Category 4 (Category 3 plus enhancing soft tissue components independent of wall or septum).  The maximal dimension of the renal tumor is 0.5 centimeters.  The mass is located in the Right side and is primarily located in the lower pole.  The tumor is also primarily exophytic.  The tumor primarily lies on the lateral surface.  With regard to the collecting system, the edge of the tumor arrives more than 7 mm from the collecting system.      She also has a 3 cm right anterior, interpolar primarily exophytic angiomyolipoma.  No history of heamturia      Concerning  her renal function, her last SCr is   Lab Results   Component Value Date    CR 0.65 12/21/2017   .  she has the following risk factors for development of chronic kidney disease renal masses.    ECOG Performance Score: 0  0=Fully active, 1=Unable to do strenuous activity but capable of light work, 2=Ambulatory and capable of all selfcare, 3=Capable of limited selfcare, confined to bed >50% of waking hours, 4=Completely disabled.           Past Medical History:     Past Medical History:   Diagnosis Date     Other and unspecified hyperlipidemia      Symptomatic states associated with artificial menopause      Unspecified hypothyroidism             Past Surgical History:     Past Surgical History:   Procedure Laterality Date     BIOPSY       BIOPSY BONE LOWER EXTREMITY Left 4/22/2016    Procedure: BIOPSY BONE LOWER EXTREMITY;  Surgeon: Louis Dykes MD;  Location: [facility] OR     C MAMMOGRAM, SCREENING      normal per pt     HC COLONOSCOPY THRU  "STOMA, DIAGNOSTIC      normal per pt     HC REDUCTION OF LARGE BREAST       HYSTERECTOMY, IBNDU      w/ BSO for fibroid            Medications     Current Outpatient Prescriptions   Medication     Misc. Devices (CRUTCH) MISC     oxyCODONE-acetaminophen (PERCOCET) 5-325 MG per tablet     senna-docusate (SENOKOT-S;PERICOLACE) 8.6-50 MG per tablet     Misc. Devices (WALKER AUTO GLIDES) MISC     amoxicillin (AMOXIL) 500 MG capsule     cimetidine (TAGAMET) 800 MG tablet     cyanocobalamin (VITAMIN B12) 1000 MCG/ML injection     hydrochlorothiazide (MICROZIDE) 12.5 MG capsule     levothyroxine (SYNTHROID/LEVOTHROID) 112 MCG tablet     rosuvastatin (CRESTOR) 10 MG tablet     traMADol (ULTRAM) 50 MG tablet     No current facility-administered medications for this visit.             Family History:   No family history on file.         Social History:     Social History     Social History     Marital status:      Spouse name: N/A     Number of children: N/A     Years of education: N/A     Occupational History     Not on file.     Social History Main Topics     Smoking status: Never Smoker     Smokeless tobacco: Never Used     Alcohol use No     Drug use: No     Sexual activity: Not on file     Other Topics Concern     Not on file     Social History Narrative            Allergies:   Asa [aspirin]; Atorvastatin; Diphth-tet tox-pertus vac; Diphtheria toxoid-tetanus tox-thimerosal; Ibuprofen; Nsaids; and Pseudoephedrine         Review of Systems:  From intake questionnaire     Negative 14 system review except as noted on HPI, nurse's note.         Physical Exam:     Patient is a 62 year old  female   Vitals: Blood pressure 125/85, pulse 89, height 1.6 m (5' 3\"), weight 81.4 kg (179 lb 6.4 oz).  General Appearance Adult: Alert, no acute distress, oriented  HENT: throat/mouth:normal, good dentition  Lungs: no respiratory distress, or pursed lip breathing  Heart: No obvious jugular venous distension present  Abdomen: soft, " nontender, no organomegaly or masses, Body mass index is 31.78 kg/(m^2).  Musculoskeltal: extremities normal, no peripheral edema  Skin: no suspicious lesions or rashes  Neuro: Alert, oriented, speech and mentation normal  Psych: affect and mood normal  Gait: Normal        Labs and Pathology:    I reviewed all applicable laboratory and pathology data reviewed with patient  Significant for     Lab Results   Component Value Date    CR 0.65 12/21/2017    CR 0.66 12/12/2017    CR 0.84 08/24/2017    CR 0.68 05/25/2017    CR 0.72 02/28/2017    CR 0.68 11/08/2016    CR 0.73 08/11/2016    CR 0.70 05/05/2016    CR 0.60 04/04/2016           Imaging:    I reviewed all applicable imaging and went over the results with the patient.  Significant for CT from 2018 compared to ct from 2016 and no significant change noted      Outside and Past Medical records:    I spent 10 minutes reviewing outside and past medical records.           Assessment and Plan:     Assessment:   Cystic renal mass, very small with a 0.5 cm enhancing nodular area, stable since 2016    Plan:  Would recommend follow up in 1 year. If stable, would follow up in 2-3 years with CT kidney protocol.    If blood return sooner    If we ever intervene for either mass, would remove both masses.    Would only intervene for AML if it is growing or gets much larger I.e. 5 cms or so.    She is relieved.      Orders  Orders Placed This Encounter   Procedures     CT Abdomen w/o & w Contrast           Viral Angel MD  Department of Urology Staff  [facility]        CC  Patient Care Team:  Misti Starr NP as PCP - General  Louis Dykes MD as MD (Orthopedics)  Ayse Velarde, RN as Nurse Coordinator (Hematology & Oncology)  Viral Angel MD as MD (Urology)  Jeannette Renee, RN as Registered Nurse (Oncology)  KENNETH GONZALEZ    Copy to patient  KAYODE BENNETT  [address] [facility] [address]

## 2018-03-15 NOTE — MR AVS SNAPSHOT
After Visit Summary   3/15/2018    Yancy Jacobs    MRN: [id number]           Patient Information     Date Of Birth          1955        Visit Information        Provider Department      3/15/2018 12:40 PM Weight, Viral Lamas MD [facility] Urology and [facility] for Prostate and Urologic Cancers        Today's Diagnoses     Renal mass    -  1       Follow-ups after your visit        Your next 10 appointments already scheduled     Apr 11, 2018  9:45 AM CDT   [facility] Lab Draw with  [facility] LAB DRAW   [facility] Lab Draw ([facility])    [address]  [address]  [facility] [address] [phone]            Apr 11, 2018 10:20 AM CDT   (Arrive by 10:05 AM)   Return Visit with Elissa Villarreal PA-C   [facility] ([facility])    [address] [place] [address]  [address]  [facility] [address] [phone]              Future tests that were ordered for you today     Open Future Orders        Priority Expected Expires Ordered    CT Abdomen w/o & w Contrast Routine  3/14/2019 3/15/2018            Who to contact     Please call your clinic at [phone] to:    Ask questions about your health    Make or cancel appointments    Discuss your medicines    Learn about your test results    Speak to your doctor            Additional Information About Your Visit        [employer] Information     OneClass gives you secure access to your electronic health record. If you see a primary care provider, you can also send messages to your care team and make appointments. If you have questions, please call your primary care clinic.  If you do not have a primary care provider, please call [phone] and they will assist you.      OneClass is an electronic gateway that provides easy, online access to your medical records. With OneClass, you can request a clinic appointment, read your test results, renew a prescription or communicate with  "your care team.     To access your existing account, please contact your [facility] or call [phone] for assistance.        Care EveryWhere ID     This is your Care EveryWhere ID. This could be used by other organizations to access your [facility] medical records  [id number]        Your Vitals Were     Pulse Height BMI (Body Mass Index)             89 1.6 m (5' 3\") 31.78 kg/m2          Blood Pressure from Last 3 Encounters:   03/15/18 125/85   12/21/17 156/75   08/24/17 153/87    Weight from Last 3 Encounters:   03/15/18 81.4 kg (179 lb 6.4 oz)   02/28/17 86.9 kg (191 lb 9.6 oz)   11/17/16 83.1 kg (183 lb 4.8 oz)               Primary Care Provider Office Phone # Fax #    Misti StarrRONNY [phone]       [facility] [address] [place] [address]        Equal Access to Services     REGAN [facility] HospitalRAUL AH: Hadii aad ku hadasho Soomaali, waaxda luqadaha, qaybta kaalmada adeegyada, waxay johanain hayrefugio field . So [facility] [phone].    ATENCIÓN: Si habla español, tiene a parker disposición servicios gratuitos de asistencia lingüística. [facility] [phone].    We comply with applicable federal civil rights laws and [place] laws. We do not discriminate on the basis of race, color, national origin, age, disability, sex, sexual orientation, or gender identity.            Thank you!     Thank you for choosing [facility] UROLOGY AND [facility] FOR PROSTATE AND UROLOGIC CANCERS  for your care. Our goal is always to provide you with excellent care. Hearing back from our patients is one way we can continue to improve our services. Please take a few minutes to complete the written survey that you may receive in the mail after your visit with us. Thank you!             Your Updated Medication List - Protect others around you: Learn how to safely use, store and throw away your medicines at www.disposemymeds.org.          This list is accurate as of " 3/15/18  1:17 PM.  Always use your most recent med list.                   Brand Name Dispense Instructions for use Diagnosis    amoxicillin 500 MG capsule    AMOXIL     500 mg Reported on 2/28/2017        cimetidine 800 MG tablet    TAGAMET     Take 800 mg by mouth Reported on 2/28/2017        * Crutch Misc      1 unit. For home use.        * Walker Auto Glides Misc      Rolling Walker for home use.        cyanocobalamin 1000 MCG/ML injection    VITAMIN B12     Inject 1,000 mcg into the muscle        hydrochlorothiazide 12.5 MG capsule    MICROZIDE     Take 12.5 mg by mouth        levothyroxine 112 MCG tablet    SYNTHROID/LEVOTHROID     Take 112 mcg by mouth        oxyCODONE-acetaminophen 5-325 MG per tablet    PERCOCET     Take 1-2 tablets by mouth        rosuvastatin 10 MG tablet    CRESTOR     Take 10 mg by mouth        senna-docusate 8.6-50 MG per tablet    SENOKOT-S;PERICOLACE     Take 2 tablets by mouth        traMADol 50 MG tablet    ULTRAM     Take 50 mg by mouth        * Notice:  This list has 2 medication(s) that are the same as other medications prescribed for you. Read the directions carefully, and ask your doctor or other care provider to review them with you.

## 2018-03-15 NOTE — NURSING NOTE
"Chief Complaint   Patient presents with     Consult     New patient consult for renal mass.         Initial /85  Pulse 89  Ht 1.6 m (5' 3\")  Wt 81.4 kg (179 lb 6.4 oz)  BMI 31.78 kg/m2 Estimated body mass index is 31.78 kg/(m^2) as calculated from the following:    Height as of this encounter: 1.6 m (5' 3\").    Weight as of this encounter: 81.4 kg (179 lb 6.4 oz).  Medication Reconciliation: complete     MCKAY Walter    "

## 2018-03-15 NOTE — LETTER
3/15/2018       RE: Yancy Jacobs  [address] [address]     Dear Colleague,    Thank you for referring your patient, Yancy Jacobs, to the [facility] UROLOGY AND [facility] FOR PROSTATE AND [facility]OLOGIC CAN[facility]. Please see a copy of my visit note below.          Chief Complaint:   2 Renal Masses         Consult or Referral:     Mr. Yancy Jacobs is a 62 year old female seen in consultation from Dr. Voss.         History of Present Illness:    Yancy Jacobs is a very pleasant 62 year old female who presents with a history of a Renal Mass.  This was discovered incidentally in follow up on her lymphoma.  Initially diagnosed about 2 year(s) ago.  The mass is cystic ( Category 4 (Category 3 plus enhancing soft tissue components independent of wall or septum).  The maximal dimension of the renal tumor is 0.5 centimeters.  The mass is located in the Right side and is primarily located in the lower pole.  The tumor is also primarily exophytic.  The tumor primarily lies on the lateral surface.  With regard to the collecting system, the edge of the tumor arrives more than 7 mm from the collecting system.      She also has a 3 cm right anterior, interpolar primarily exophytic angiomyolipoma.  No history of heamturia      Concerning  her renal function, her last SCr is   Lab Results   Component Value Date    CR 0.65 12/21/2017   .  she has the following risk factors for development of chronic kidney disease renal masses.    ECOG Performance Score: 0  0=Fully active, 1=Unable to do strenuous activity but capable of light work, 2=Ambulatory and capable of all selfcare, 3=Capable of limited selfcare, confined to bed >50% of waking hours, 4=Completely disabled.           Past Medical History:     Past Medical History:   Diagnosis Date     Other and unspecified hyperlipidemia      Symptomatic states associated with artificial menopause      Unspecified  hypothyroidism             Past Surgical History:     Past Surgical History:   Procedure Laterality Date     BIOPSY       BIOPSY BONE LOWER EXTREMITY Left 4/22/2016    Procedure: BIOPSY BONE LOWER EXTREMITY;  Surgeon: Louis Dykes MD;  Location: [facility]     C MAMMOGRAM, SCREENING      normal per pt     HC COLONOSCOPY THRU STOMA, DIAGNOSTIC      normal per pt     HC REDUCTION OF LARGE BREAST       HYSTERECTOMY, BINDU      w/ BSO for fibroid            Medications     Current Outpatient Prescriptions   Medication     Misc. Devices (CRUTCH) MISC     oxyCODONE-acetaminophen (PERCOCET) 5-325 MG per tablet     senna-docusate (SENOKOT-S;PERICOLACE) 8.6-50 MG per tablet     Misc. Devices (WALKER AUTO GLIDES) MISC     amoxicillin (AMOXIL) 500 MG capsule     cimetidine (TAGAMET) 800 MG tablet     cyanocobalamin (VITAMIN B12) 1000 MCG/ML injection     hydrochlorothiazide (MICROZIDE) 12.5 MG capsule     levothyroxine (SYNTHROID/LEVOTHROID) 112 MCG tablet     rosuvastatin (CRESTOR) 10 MG tablet     traMADol (ULTRAM) 50 MG tablet     No current facility-administered medications for this visit.             Family History:   No family history on file.         Social History:     Social History     Social History     Marital status:      Spouse name: N/A     Number of children: N/A     Years of education: N/A     Occupational History     Not on file.     Social History Main Topics     Smoking status: Never Smoker     Smokeless tobacco: Never Used     Alcohol use No     Drug use: No     Sexual activity: Not on file     Other Topics Concern     Not on file     Social History Narrative            Allergies:   Asa [aspirin]; Atorvastatin; Diphth-tet tox-pertus vac; Diphtheria toxoid-tetanus tox-thimerosal; Ibuprofen; Nsaids; and Pseudoephedrine         Review of Systems:  From intake questionnaire     Negative 14 system review except as noted on HPI, nurse's note.         Physical Exam:     Patient is a 62 year old  female  "  Vitals: Blood pressure 125/85, pulse 89, height 1.6 m (5' 3\"), weight 81.4 kg (179 lb 6.4 oz).  General Appearance Adult: Alert, no acute distress, oriented  HENT: throat/mouth:normal, good dentition  Lungs: no respiratory distress, or pursed lip breathing  Heart: No obvious jugular venous distension present  Abdomen: soft, nontender, no organomegaly or masses, Body mass index is 31.78 kg/(m^2).  Musculoskeltal: extremities normal, no peripheral edema  Skin: no suspicious lesions or rashes  Neuro: Alert, oriented, speech and mentation normal  Psych: affect and mood normal  Gait: Normal        Labs and Pathology:    I reviewed all applicable laboratory and pathology data reviewed with patient  Significant for     Lab Results   Component Value Date    CR 0.65 12/21/2017    CR 0.66 12/12/2017    CR 0.84 08/24/2017    CR 0.68 05/25/2017    CR 0.72 02/28/2017    CR 0.68 11/08/2016    CR 0.73 08/11/2016    CR 0.70 05/05/2016    CR 0.60 04/04/2016           Imaging:    I reviewed all applicable imaging and went over the results with the patient.  Significant for CT from 2018 compared to ct from 2016 and no significant change noted      Outside and Past Medical records:    I spent 10 minutes reviewing outside and past medical records.           Assessment and Plan:     Assessment:   Cystic renal mass, very small with a 0.5 cm enhancing nodular area, stable since 2016    Plan:  Would recommend follow up in 1 year. If stable, would follow up in 2-3 years with CT kidney protocol.    If blood return sooner    If we ever intervene for either mass, would remove both masses.    Would only intervene for AML if it is growing or gets much larger I.e. 5 cms or so.    She is relieved.      Orders  Orders Placed This Encounter   Procedures     CT Abdomen w/o & w Contrast           Viral Angel MD  Department of Urology Staff  [facility]        CC  Patient Care Team:  Misti Starr NP as PCP - [facility] Dykes, " Louis DIEHL MD as MD (Orthopedics)  Ayse Velarde, RN as Nurse Coordinator (Hematology & Oncology)  Weight, Viral Lamas MD as MD (Urology)  Jeannette Renee, RN as Registered Nurse (Oncology)  KENNETH GONZALEZ    Copy to patient  KAYODE BENNETT  [address] [id number]

## 2018-04-25 ENCOUNTER — CARE COORDINATION (OUTPATIENT)
Dept: ONCOLOGY | Facility: CLINIC | Age: 63
End: 2018-04-25

## 2018-04-25 NOTE — PROGRESS NOTES
" has reached out to Yancy several times, the last being today about her oncology care. Since Doctor Shanika has left she has said she possibly is going to transfer her care to a new oncology doctor closer to home. She had an appointment with Elissa REBOLLEDO on 4/12 which she did not show. Her phone goes immediately to .  has left messages for a return call without success. Today another message with writers direct number to update us on her plans. Writer message included assisting her with a transfer of care if that is what she has chosen. Awaiting call back. A \"no show\" letter was sent also to Yancy's home after 4/12 which describes how she can get an appointment if she would desire.   "

## 2019-10-02 ENCOUNTER — HEALTH MAINTENANCE LETTER (OUTPATIENT)
Age: 64
End: 2019-10-02

## 2019-12-15 ENCOUNTER — HEALTH MAINTENANCE LETTER (OUTPATIENT)
Age: 64
End: 2019-12-15

## 2021-01-15 ENCOUNTER — HEALTH MAINTENANCE LETTER (OUTPATIENT)
Age: 66
End: 2021-01-15

## 2021-05-03 NOTE — LETTER
2/28/2017      RE: Yancy Jacobs  [address] [facility] [id number]       Oncology/Hematology Visit Note  Feb 28, 2017    Reason for Visit: follow up of follicular lymphoma, evaluate left axillary mass    History of Present Illness: Yancy Jacobs is a 61 year old female with follicular lymphoma.  She was treated with 4 weekly doses of Rituxan in June 2016. Subsequent imaging in July 2016 showed marked interval decrease with near resolution of FDG uptake in the multiple hypermetabolic osseous lesions. CT CAP on 11/8/16 showed no evidence of recurrent disease.  Please see previous notes for further details on the patient's history. She comes in today for evaluation of a left axillary mass.    Interval History:  Patient reports that she noticed a left axillary lump on 2/19. She saw her PCP and reports that she had a black head removed. Due to persistent bump, she had an ultrasound, which she notes had a small lymph node. She was picking at it yesterday and noted pus. She reports a history of MRSA and was treated for an infection in May 2016 with clindamycin. She denies any fevers, chills, night sweats, other lumps or bumps. She has had a poor appetite for the past 2 days due to concern about the potential for lymphoma recurrence. She denies other concerns.     Current Outpatient Prescriptions   Medication Sig Dispense Refill     cyanocobalamin (VITAMIN B12) 1000 MCG/ML injection Inject 1,000 mcg into the muscle       hydrochlorothiazide (MICROZIDE) 12.5 MG capsule Take 12.5 mg by mouth       levothyroxine (SYNTHROID/LEVOTHROID) 112 MCG tablet Take 112 mcg by mouth       rosuvastatin (CRESTOR) 10 MG tablet Take 10 mg by mouth       traMADol (ULTRAM) 50 MG tablet Take 50 mg by mouth       sulfamethoxazole-trimethoprim (BACTRIM DS/SEPTRA DS) 800-160 MG per tablet Take 1 tablet by mouth 2 times daily for 7 days 14 tablet 0     amoxicillin (AMOXIL) 500 MG capsule 500 mg Reported on 2/28/2017       cimetidine  "(TAGAMET) 800 MG tablet Take 800 mg by mouth Reported on 2/28/2017       [DISCONTINUED] hydrochlorothiazide (MICROZIDE) 12.5 MG capsule Take 12.5 mg by mouth       Physical Examination:  General: The patient is a pleasant female in no acute distress. She appears moderately anxious.  /90 (BP Location: Right arm, Patient Position: Chair, Cuff Size: Adult Large)  Pulse 95  Temp 98.5  F (36.9  C) (Oral)  Resp 16  Ht 1.6 m (5' 3\")  Wt 86.9 kg (191 lb 9.6 oz)  SpO2 95%  BMI 33.94 kg/m2  Wt Readings from Last 10 Encounters:   02/28/17 86.9 kg (191 lb 9.6 oz)   11/17/16 83.1 kg (183 lb 4.8 oz)   06/24/16 81.1 kg (178 lb 12.8 oz)   06/17/16 82 kg (180 lb 11.2 oz)   06/10/16 80.9 kg (178 lb 4.8 oz)   06/03/16 81 kg (178 lb 9.6 oz)   05/11/16 81.2 kg (179 lb 1.6 oz)   05/05/16 81.6 kg (180 lb)   04/22/16 82 kg (180 lb 12.4 oz)   04/04/16 80.7 kg (177 lb 14.4 oz)   HEENT: EOMI. Sclerae are anicteric.   Lymph: Neck is supple with no lymphadenopathy in the cervical or supraclavicular areas. 6 mm left axillary lump palpated, mildly tender with associated mild amount of white drainage. No other left axillary adenopathy. No right axillary adenopathy palpable. No palpable inguinal adenopathy.   Heart: Regular rate and rhythm.   Lungs: Clear to auscultation bilaterally.   Abdomen: Bowel sounds present, soft, nontender with no palpable hepatosplenomegaly or masses.   Extremities: No lower extremity edema noted bilaterally.   Neuro: Cranial nerves II through XII are grossly intact.  Skin: As above. No rashes, petechiae, or bruising noted on exposed skin.    Laboratory Data:  Results for orders placed or performed in visit on 02/28/17 (from the past 24 hour(s))   Comprehensive metabolic panel   Result Value Ref Range    Sodium 137 133 - 144 mmol/L    Potassium 3.8 3.4 - 5.3 mmol/L    Chloride 100 94 - 109 mmol/L    Carbon Dioxide 28 20 - 32 mmol/L    Anion Gap 9 3 - 14 mmol/L    Glucose 113 (H) 70 - 99 mg/dL    Urea Nitrogen 12 7 - " 30 mg/dL    Creatinine 0.72 0.52 - 1.04 mg/dL    GFR Estimate 81 >60 mL/min/1.7m2    GFR Estimate If Black >90   GFR Calc   >60 mL/min/1.7m2    Calcium 9.2 8.5 - 10.1 mg/dL    Bilirubin Total 0.7 0.2 - 1.3 mg/dL    Albumin 4.2 3.4 - 5.0 g/dL    Protein Total 7.7 6.8 - 8.8 g/dL    Alkaline Phosphatase 104 40 - 150 U/L    ALT 37 0 - 50 U/L    AST 27 0 - 45 U/L   Lactate Dehydrogenase   Result Value Ref Range    Lactate Dehydrogenase 202 81 - 234 U/L   *CBC with platelets differential   Result Value Ref Range    WBC 6.3 4.0 - 11.0 10e9/L    RBC Count 4.46 3.8 - 5.2 10e12/L    Hemoglobin 13.8 11.7 - 15.7 g/dL    Hematocrit 40.9 35.0 - 47.0 %    MCV 92 78 - 100 fl    MCH 30.9 26.5 - 33.0 pg    MCHC 33.7 31.5 - 36.5 g/dL    RDW 12.3 10.0 - 15.0 %    Platelet Count 187 150 - 450 10e9/L    Diff Method Automated Method     % Neutrophils 70.2 %    % Lymphocytes 22.2 %    % Monocytes 5.8 %    % Eosinophils 1.0 %    % Basophils 0.5 %    % Immature Granulocytes 0.3 %    Nucleated RBCs 0 0 /100    Absolute Neutrophil 4.4 1.6 - 8.3 10e9/L    Absolute Lymphocytes 1.4 0.8 - 5.3 10e9/L    Absolute Monocytes 0.4 0.0 - 1.3 10e9/L    Absolute Eosinophils 0.1 0.0 - 0.7 10e9/L    Absolute Basophils 0.0 0.0 - 0.2 10e9/L    Abs Immature Granulocytes 0.0 0 - 0.4 10e9/L    Absolute Nucleated RBC 0.0      Assessment and Plan:  1. Folliculitis. Lesion in left axilla is most consistent with a folliculitis. Small amount of drainage was collected for a gram stain and culture. Given history of MRSA, will treat with Bactrim, as she felt she did not tolerate the clindamycin well.     2. Follicular lymphoma. No evidence of recurrence on H&P. Will f/u with Dr. Voss as scheduled the end of May with a CT CAP. She will call sooner for concerns.     Elissa Villarreal PA-C  [facility]  [address] [place] [id number] [phone]    Addendum: Outside left axillary u/s on 2/21/17 shows the following: 4 x 3 x 2 mm  hypoechoic lesion.     Chief Complaint   Patient presents with     Blood Draw     lab draw in right arm, vitals taken      BRITTNEY Evans PA-C       Otezla Counseling: The side effects of Otezla were discussed with the patient, including but not limited to worsening or new depression, weight loss, diarrhea, nausea, upper respiratory tract infection, and headache. Patient instructed to call the office should any adverse effect occur.  The patient verbalized understanding of the proper use and possible adverse effects of Otezla.  All the patient's questions and concerns were addressed. Hydroxychloroquine Counseling:  I discussed with the patient that a baseline ophthalmologic exam is needed at the start of therapy and every year thereafter while on therapy. A CBC may also be warranted for monitoring.  The side effects of this medication were discussed with the patient, including but not limited to agranulocytosis, aplastic anemia, seizures, rashes, retinopathy, and liver toxicity. Patient instructed to call the office should any adverse effect occur.  The patient verbalized understanding of the proper use and possible adverse effects of Plaquenil.  All the patient's questions and concerns were addressed. Azithromycin Counseling:  I discussed with the patient the risks of azithromycin including but not limited to GI upset, allergic reaction, drug rash, diarrhea, and yeast infections. Spironolactone Counseling: Patient advised regarding risks of diarrhea, abdominal pain, hyperkalemia, birth defects (for female patients), liver toxicity and renal toxicity. The patient may need blood work to monitor liver and kidney function and potassium levels while on therapy. The patient verbalized understanding of the proper use and possible adverse effects of spironolactone.  All of the patient's questions and concerns were addressed. Carac Pregnancy And Lactation Text: This medication is Pregnancy Category X and contraindicated in pregnancy and in women who may become pregnant. It is unknown if this medication is excreted in breast milk. Drysol Pregnancy And Lactation Text: This medication is considered safe during pregnancy and breast feeding. Sski Pregnancy And Lactation Text: This medication is Pregnancy Category D and isn't considered safe during pregnancy. It is excreted in breast milk. Nsaids Pregnancy And Lactation Text: These medications are considered safe up to 30 weeks gestation. It is excreted in breast milk. Dupixent Pregnancy And Lactation Text: This medication likely crosses the placenta but the risk for the fetus is uncertain. This medication is excreted in breast milk. Itraconazole Counseling:  I discussed with the patient the risks of itraconazole including but not limited to liver damage, nausea/vomiting, neuropathy, and severe allergy.  The patient understands that this medication is best absorbed when taken with acidic beverages such as non-diet cola or ginger ale.  The patient understands that monitoring is required including baseline LFTs and repeat LFTs at intervals.  The patient understands that they are to contact us or the primary physician if concerning signs are noted. Topical Clindamycin Pregnancy And Lactation Text: This medication is Pregnancy Category B and is considered safe during pregnancy. It is unknown if it is excreted in breast milk. Enbrel Counseling:  I discussed with the patient the risks of etanercept including but not limited to myelosuppression, immunosuppression, autoimmune hepatitis, demyelinating diseases, lymphoma, and infections.  The patient understands that monitoring is required including a PPD at baseline and must alert us or the primary physician if symptoms of infection or other concerning signs are noted. Enbrel Pregnancy And Lactation Text: This medication is Pregnancy Category B and is considered safe during pregnancy. It is unknown if this medication is excreted in breast milk. Bexarotene Counseling:  I discussed with the patient the risks of bexarotene including but not limited to hair loss, dry lips/skin/eyes, liver abnormalities, hyperlipidemia, pancreatitis, depression/suicidal ideation, photosensitivity, drug rash/allergic reactions, hypothyroidism, anemia, leukopenia, infection, cataracts, and teratogenicity.  Patient understands that they will need regular blood tests to check lipid profile, liver function tests, white blood cell count, thyroid function tests and pregnancy test if applicable. Ketoconazole Pregnancy And Lactation Text: This medication is Pregnancy Category C and it isn't know if it is safe during pregnancy. It is also excreted in breast milk and breast feeding isn't recommended. Hydroquinone Pregnancy And Lactation Text: This medication has not been assigned a Pregnancy Risk Category but animal studies failed to show danger with the topical medication. It is unknown if the medication is excreted in breast milk. Minocycline Pregnancy And Lactation Text: This medication is Pregnancy Category D and not consider safe during pregnancy. It is also excreted in breast milk. Cephalexin Counseling: I counseled the patient regarding use of cephalexin as an antibiotic for prophylactic and/or therapeutic purposes. Cephalexin (commonly prescribed under brand name Keflex) is a cephalosporin antibiotic which is active against numerous classes of bacteria, including most skin bacteria. Side effects may include nausea, diarrhea, gastrointestinal upset, rash, hives, yeast infections, and in rare cases, hepatitis, kidney disease, seizures, fever, confusion, neurologic symptoms, and others. Patients with severe allergies to penicillin medications are cautioned that there is about a 10% incidence of cross-reactivity with cephalosporins. When possible, patients with penicillin allergies should use alternatives to cephalosporins for antibiotic therapy. Skyrizi Counseling: I discussed with the patient the risks of risankizumab-rzaa including but not limited to immunosuppression, and serious infections.  The patient understands that monitoring is required including a PPD at baseline and must alert us or the primary physician if symptoms of infection or other concerning signs are noted. Fluconazole Pregnancy And Lactation Text: This medication is Pregnancy Category C and it isn't know if it is safe during pregnancy. It is also excreted in breast milk. Benzoyl Peroxide Pregnancy And Lactation Text: This medication is Pregnancy Category C. It is unknown if benzoyl peroxide is excreted in breast milk. Dapsone Counseling: I discussed with the patient the risks of dapsone including but not limited to hemolytic anemia, agranulocytosis, rashes, methemoglobinemia, kidney failure, peripheral neuropathy, headaches, GI upset, and liver toxicity.  Patients who start dapsone require monitoring including baseline LFTs and weekly CBCs for the first month, then every month thereafter.  The patient verbalized understanding of the proper use and possible adverse effects of dapsone.  All of the patient's questions and concerns were addressed. Minoxidil Counseling: Minoxidil is a topical medication which can increase blood flow where it is applied. It is uncertain how this medication increases hair growth. Side effects are uncommon and include stinging and allergic reactions. Use Enhanced Medication Counseling?: No Simponi Counseling:  I discussed with the patient the risks of golimumab including but not limited to myelosuppression, immunosuppression, autoimmune hepatitis, demyelinating diseases, lymphoma, and serious infections.  The patient understands that monitoring is required including a PPD at baseline and must alert us or the primary physician if symptoms of infection or other concerning signs are noted. Picato Pregnancy And Lactation Text: This medication is Pregnancy Category C. It is unknown if this medication is excreted in breast milk. Prednisone Pregnancy And Lactation Text: This medication is Pregnancy Category C and it isn't know if it is safe during pregnancy. This medication is excreted in breast milk. Cimzia Counseling:  I discussed with the patient the risks of Cimzia including but not limited to immunosuppression, allergic reactions and infections.  The patient understands that monitoring is required including a PPD at baseline and must alert us or the primary physician if symptoms of infection or other concerning signs are noted. Erythromycin Counseling:  I discussed with the patient the risks of erythromycin including but not limited to GI upset, allergic reaction, drug rash, diarrhea, increase in liver enzymes, and yeast infections. Erivedge Counseling- I discussed with the patient the risks of Erivedge including but not limited to nausea, vomiting, diarrhea, constipation, weight loss, changes in the sense of taste, decreased appetite, muscle spasms, and hair loss.  The patient verbalized understanding of the proper use and possible adverse effects of Erivedge.  All of the patient's questions and concerns were addressed. Tremfya Counseling: I discussed with the patient the risks of guselkumab including but not limited to immunosuppression, serious infections, and drug reactions.  The patient understands that monitoring is required including a PPD at baseline and must alert us or the primary physician if symptoms of infection or other concerning signs are noted. Gabapentin Counseling: I discussed with the patient the risks of gabapentin including but not limited to dizziness, somnolence, fatigue and ataxia. Tetracycline Counseling: Patient counseled regarding possible photosensitivity and increased risk for sunburn.  Patient instructed to avoid sunlight, if possible.  When exposed to sunlight, patients should wear protective clothing, sunglasses, and sunscreen.  The patient was instructed to call the office immediately if the following severe adverse effects occur:  hearing changes, easy bruising/bleeding, severe headache, or vision changes.  The patient verbalized understanding of the proper use and possible adverse effects of tetracycline.  All of the patient's questions and concerns were addressed. Patient understands to avoid pregnancy while on therapy due to potential birth defects. Ivermectin Pregnancy And Lactation Text: This medication is Pregnancy Category C and it isn't known if it is safe during pregnancy. It is also excreted in breast milk. Topical Clindamycin Counseling: Patient counseled that this medication may cause skin irritation or allergic reactions.  In the event of skin irritation, the patient was advised to reduce the amount of the drug applied or use it less frequently.   The patient verbalized understanding of the proper use and possible adverse effects of clindamycin.  All of the patient's questions and concerns were addressed. Thalidomide Counseling: I discussed with the patient the risks of thalidomide including but not limited to birth defects, anxiety, weakness, chest pain, dizziness, cough and severe allergy. Doxycycline Pregnancy And Lactation Text: This medication is Pregnancy Category D and not consider safe during pregnancy. It is also excreted in breast milk but is considered safe for shorter treatment courses. Doxepin Counseling:  Patient advised that the medication is sedating and not to drive a car after taking this medication. Patient informed of potential adverse effects including but not limited to dry mouth, urinary retention, and blurry vision.  The patient verbalized understanding of the proper use and possible adverse effects of doxepin.  All of the patient's questions and concerns were addressed. Infliximab Counseling:  I discussed with the patient the risks of infliximab including but not limited to myelosuppression, immunosuppression, autoimmune hepatitis, demyelinating diseases, lymphoma, and serious infections.  The patient understands that monitoring is required including a PPD at baseline and must alert us or the primary physician if symptoms of infection or other concerning signs are noted. Colchicine Counseling:  Patient counseled regarding adverse effects including but not limited to stomach upset (nausea, vomiting, stomach pain, or diarrhea).  Patient instructed to limit alcohol consumption while taking this medication.  Colchicine may reduce blood counts especially with prolonged use.  The patient understands that monitoring of kidney function and blood counts may be required, especially at baseline. The patient verbalized understanding of the proper use and possible adverse effects of colchicine.  All of the patient's questions and concerns were addressed. Odomzo Counseling- I discussed with the patient the risks of Odomzo including but not limited to nausea, vomiting, diarrhea, constipation, weight loss, changes in the sense of taste, decreased appetite, muscle spasms, and hair loss.  The patient verbalized understanding of the proper use and possible adverse effects of Odomzo.  All of the patient's questions and concerns were addressed. Xolair Counseling:  Patient informed of potential adverse effects including but not limited to fever, muscle aches, rash and allergic reactions.  The patient verbalized understanding of the proper use and possible adverse effects of Xolair.  All of the patient's questions and concerns were addressed. Picato Counseling:  I discussed with the patient the risks of Picato including but not limited to erythema, scaling, itching, weeping, crusting, and pain. Benzoyl Peroxide Counseling: Patient counseled that medicine may cause skin irritation and bleach clothing.  In the event of skin irritation, the patient was advised to reduce the amount of the drug applied or use it less frequently.   The patient verbalized understanding of the proper use and possible adverse effects of benzoyl peroxide.  All of the patient's questions and concerns were addressed. Protopic Counseling: Patient may experience a mild burning sensation during topical application. Protopic is not approved in children less than 2 years of age. There have been case reports of hematologic and skin malignancies in patients using topical calcineurin inhibitors although causality is questionable. Rifampin Pregnancy And Lactation Text: This medication is Pregnancy Category C and it isn't know if it is safe during pregnancy. It is also excreted in breast milk and should not be used if you are breast feeding. Xeljanz Counseling: I discussed with the patient the risks of Xeljanz therapy including increased risk of infection, liver issues, headache, diarrhea, or cold symptoms. Live vaccines should be avoided. They were instructed to call if they have any problems. Bactrim Pregnancy And Lactation Text: This medication is Pregnancy Category D and is known to cause fetal risk.  It is also excreted in breast milk. Doxycycline Counseling:  Patient counseled regarding possible photosensitivity and increased risk for sunburn.  Patient instructed to avoid sunlight, if possible.  When exposed to sunlight, patients should wear protective clothing, sunglasses, and sunscreen.  The patient was instructed to call the office immediately if the following severe adverse effects occur:  hearing changes, easy bruising/bleeding, severe headache, or vision changes.  The patient verbalized understanding of the proper use and possible adverse effects of doxycycline.  All of the patient's questions and concerns were addressed. Griseofulvin Counseling:  I discussed with the patient the risks of griseofulvin including but not limited to photosensitivity, cytopenia, liver damage, nausea/vomiting and severe allergy.  The patient understands that this medication is best absorbed when taken with a fatty meal (e.g., ice cream or french fries). Terbinafine Counseling: Patient counseling regarding adverse effects of terbinafine including but not limited to headache, diarrhea, rash, upset stomach, liver function test abnormalities, itching, taste/smell disturbance, nausea, abdominal pain, and flatulence.  There is a rare possibility of liver failure that can occur when taking terbinafine.  The patient understands that a baseline LFT and kidney function test may be required. The patient verbalized understanding of the proper use and possible adverse effects of terbinafine.  All of the patient's questions and concerns were addressed. Dapsone Pregnancy And Lactation Text: This medication is Pregnancy Category C and is not considered safe during pregnancy or breast feeding. Drysol Counseling:  I discussed with the patient the risks of drysol/aluminum chloride including but not limited to skin rash, itching, irritation, burning. Ilumya Pregnancy And Lactation Text: The risk during pregnancy and breastfeeding is uncertain with this medication. Tazorac Counseling:  Patient advised that medication is irritating and drying.  Patient may need to apply sparingly and wash off after an hour before eventually leaving it on overnight.  The patient verbalized understanding of the proper use and possible adverse effects of tazorac.  All of the patient's questions and concerns were addressed. Ivermectin Counseling:  Patient instructed to take medication on an empty stomach with a full glass of water.  Patient informed of potential adverse effects including but not limited to nausea, diarrhea, dizziness, itching, and swelling of the extremities or lymph nodes.  The patient verbalized understanding of the proper use and possible adverse effects of ivermectin.  All of the patient's questions and concerns were addressed. Xolair Pregnancy And Lactation Text: This medication is Pregnancy Category B and is considered safe during pregnancy. This medication is excreted in breast milk. Imiquimod Counseling:  I discussed with the patient the risks of imiquimod including but not limited to erythema, scaling, itching, weeping, crusting, and pain.  Patient understands that the inflammatory response to imiquimod is variable from person to person and was educated regarded proper titration schedule.  If flu-like symptoms develop, patient knows to discontinue the medication and contact us. SSKI Counseling:  I discussed with the patient the risks of SSKI including but not limited to thyroid abnormalities, metallic taste, GI upset, fever, headache, acne, arthralgias, paraesthesias, lymphadenopathy, easy bleeding, arrhythmias, and allergic reaction. Clofazimine Pregnancy And Lactation Text: This medication is Pregnancy Category C and isn't considered safe during pregnancy. It is excreted in breast milk. Cosentyx Counseling:  I discussed with the patient the risks of Cosentyx including but not limited to worsening of Crohn's disease, immunosuppression, allergic reactions and infections.  The patient understands that monitoring is required including a PPD at baseline and must alert us or the primary physician if symptoms of infection or other concerning signs are noted. Quinolones Counseling:  I discussed with the patient the risks of fluoroquinolones including but not limited to GI upset, allergic reaction, drug rash, diarrhea, dizziness, photosensitivity, yeast infections, liver function test abnormalities, tendonitis/tendon rupture. Hydroxyzine Counseling: Patient advised that the medication is sedating and not to drive a car after taking this medication.  Patient informed of potential adverse effects including but not limited to dry mouth, urinary retention, and blurry vision.  The patient verbalized understanding of the proper use and possible adverse effects of hydroxyzine.  All of the patient's questions and concerns were addressed. Cellcept Pregnancy And Lactation Text: This medication is Pregnancy Category D and isn't considered safe during pregnancy. It is unknown if this medication is excreted in breast milk. Topical Sulfur Applications Counseling: Topical Sulfur Counseling: Patient counseled that this medication may cause skin irritation or allergic reactions.  In the event of skin irritation, the patient was advised to reduce the amount of the drug applied or use it less frequently.   The patient verbalized understanding of the proper use and possible adverse effects of topical sulfur application.  All of the patient's questions and concerns were addressed. Cyclophosphamide Counseling:  I discussed with the patient the risks of cyclophosphamide including but not limited to hair loss, hormonal abnormalities, decreased fertility, abdominal pain, diarrhea, nausea and vomiting, bone marrow suppression and infection. The patient understands that monitoring is required while taking this medication. Oxybutynin Counseling:  I discussed with the patient the risks of oxybutynin including but not limited to skin rash, drowsiness, dry mouth, difficulty urinating, and blurred vision. Clindamycin Pregnancy And Lactation Text: This medication can be used in pregnancy if certain situations. Clindamycin is also present in breast milk. Detail Level: Generalized Solaraze Counseling:  I discussed with the patient the risks of Solaraze including but not limited to erythema, scaling, itching, weeping, crusting, and pain. Isotretinoin Pregnancy And Lactation Text: This medication is Pregnancy Category X and is considered extremely dangerous during pregnancy. It is unknown if it is excreted in breast milk. High Dose Vitamin A Counseling: Side effects reviewed, pt to contact office should one occur. Topical Retinoid counseling:  Patient advised to apply a pea-sized amount only at bedtime and wait 30 minutes after washing their face before applying.  If too drying, patient may add a non-comedogenic moisturizer. The patient verbalized understanding of the proper use and possible adverse effects of retinoids.  All of the patient's questions and concerns were addressed. Hydroxyzine Pregnancy And Lactation Text: This medication is not safe during pregnancy and should not be taken. It is also excreted in breast milk and breast feeding isn't recommended. Cimetidine Counseling:  I discussed with the patient the risks of Cimetidine including but not limited to gynecomastia, headache, diarrhea, nausea, drowsiness, arrhythmias, pancreatitis, skin rashes, psychosis, bone marrow suppression and kidney toxicity. Prednisone Counseling:  I discussed with the patient the risks of prolonged use of prednisone including but not limited to weight gain, insomnia, osteoporosis, mood changes, diabetes, susceptibility to infection, glaucoma and high blood pressure.  In cases where prednisone use is prolonged, patients should be monitored with blood pressure checks, serum glucose levels and an eye exam.  Additionally, the patient may need to be placed on GI prophylaxis, PCP prophylaxis, and calcium and vitamin D supplementation and/or a bisphosphonate.  The patient verbalized understanding of the proper use and the possible adverse effects of prednisone.  All of the patient's questions and concerns were addressed. Glycopyrrolate Pregnancy And Lactation Text: This medication is Pregnancy Category B and is considered safe during pregnancy. It is unknown if it is excreted breast milk. Hydroquinone Counseling:  Patient advised that medication may result in skin irritation, lightening (hypopigmentation), dryness, and burning.  In the event of skin irritation, the patient was advised to reduce the amount of the drug applied or use it less frequently.  Rarely, spots that are treated with hydroquinone can become darker (pseudoochronosis).  Should this occur, patient instructed to stop medication and call the office. The patient verbalized understanding of the proper use and possible adverse effects of hydroquinone.  All of the patient's questions and concerns were addressed. Dupixent Counseling: I discussed with the patient the risks of dupilumab including but not limited to eye infection and irritation, cold sores, injection site reactions, worsening of asthma, allergic reactions and increased risk of parasitic infection.  Live vaccines should be avoided while taking dupilumab. Dupilumab will also interact with certain medications such as warfarin and cyclosporine. The patient understands that monitoring is required and they must alert us or the primary physician if symptoms of infection or other concerning signs are noted. Siliq Counseling:  I discussed with the patient the risks of Siliq including but not limited to new or worsening depression, suicidal thoughts and behavior, immunosuppression, malignancy, posterior leukoencephalopathy syndrome, and serious infections.  The patient understands that monitoring is required including a PPD at baseline and must alert us or the primary physician if symptoms of infection or other concerning signs are noted. There is also a special program designed to monitor depression which is required with Siliq. Isotretinoin Counseling: Patient should get monthly blood tests, not donate blood, not drive at night if vision affected, not share medication, and not undergo elective surgery for 6 months after tx completed. Side effects reviewed, pt to contact office should one occur. Odomzo Pregnancy And Lactation Text: This medication is Pregnancy Category X and is absolutely contraindicated during pregnancy. It is unknown if it is excreted in breast milk. Ketoconazole Counseling:   Patient counseled regarding improving absorption with orange juice.  Adverse effects include but are not limited to breast enlargement, headache, diarrhea, nausea, upset stomach, liver function test abnormalities, taste disturbance, and stomach pain.  There is a rare possibility of liver failure that can occur when taking ketoconazole. The patient understands that monitoring of LFTs may be required, especially at baseline. The patient verbalized understanding of the proper use and possible adverse effects of ketoconazole.  All of the patient's questions and concerns were addressed. Birth Control Pills Pregnancy And Lactation Text: This medication should be avoided if pregnant and for the first 30 days post-partum. Glycopyrrolate Counseling:  I discussed with the patient the risks of glycopyrrolate including but not limited to skin rash, drowsiness, dry mouth, difficulty urinating, and blurred vision. Cimzia Pregnancy And Lactation Text: This medication crosses the placenta but can be considered safe in certain situations. Cimzia may be excreted in breast milk. Fluconazole Counseling:  Patient counseled regarding adverse effects of fluconazole including but not limited to headache, diarrhea, nausea, upset stomach, liver function test abnormalities, taste disturbance, and stomach pain.  There is a rare possibility of liver failure that can occur when taking fluconazole.  The patient understands that monitoring of LFTs and kidney function test may be required, especially at baseline. The patient verbalized understanding of the proper use and possible adverse effects of fluconazole.  All of the patient's questions and concerns were addressed. Elidel Counseling: Patient may experience a mild burning sensation during topical application. Elidel is not approved in children less than 2 years of age. There have been case reports of hematologic and skin malignancies in patients using topical calcineurin inhibitors although causality is questionable. Azithromycin Pregnancy And Lactation Text: This medication is considered safe during pregnancy and is also secreted in breast milk. Cyclosporine Counseling:  I discussed with the patient the risks of cyclosporine including but not limited to hypertension, gingival hyperplasia,myelosuppression, immunosuppression, liver damage, kidney damage, neurotoxicity, lymphoma, and serious infections. The patient understands that monitoring is required including baseline blood pressure, CBC, CMP, lipid panel and uric acid, and then 1-2 times monthly CMP and blood pressure. Arava Counseling:  Patient counseled regarding adverse effects of Arava including but not limited to nausea, vomiting, abnormalities in liver function tests. Patients may develop mouth sores, rash, diarrhea, and abnormalities in blood counts. The patient understands that monitoring is required including LFTs and blood counts.  There is a rare possibility of scarring of the liver and lung problems that can occur when taking methotrexate. Persistent nausea, loss of appetite, pale stools, dark urine, cough, and shortness of breath should be reported immediately. Patient advised to discontinue Arava treatment and consult with a physician prior to attempting conception. The patient will have to undergo a treatment to eliminate Arava from the body prior to conception. Ilumya Counseling: I discussed with the patient the risks of tildrakizumab including but not limited to immunosuppression, malignancy, posterior leukoencephalopathy syndrome, and serious infections.  The patient understands that monitoring is required including a PPD at baseline and must alert us or the primary physician if symptoms of infection or other concerning signs are noted. Taltz Counseling: I discussed with the patient the risks of ixekizumab including but not limited to immunosuppression, serious infections, worsening of inflammatory bowel disease and drug reactions.  The patient understands that monitoring is required including a PPD at baseline and must alert us or the primary physician if symptoms of infection or other concerning signs are noted. 5-Fu Counseling: 5-Fluorouracil Counseling:  I discussed with the patient the risks of 5-fluorouracil including but not limited to erythema, scaling, itching, weeping, crusting, and pain. Azathioprine Counseling:  I discussed with the patient the risks of azathioprine including but not limited to myelosuppression, immunosuppression, hepatotoxicity, lymphoma, and infections.  The patient understands that monitoring is required including baseline LFTs, Creatinine, possible TPMP genotyping and weekly CBCs for the first month and then every 2 weeks thereafter.  The patient verbalized understanding of the proper use and possible adverse effects of azathioprine.  All of the patient's questions and concerns were addressed. Rituxan Counseling:  I discussed with the patient the risks of Rituxan infusions. Side effects can include infusion reactions, severe drug rashes including mucocutaneous reactions, reactivation of latent hepatitis and other infections and rarely progressive multifocal leukoencephalopathy.  All of the patient's questions and concerns were addressed. Spironolactone Pregnancy And Lactation Text: This medication can cause feminization of the male fetus and should be avoided during pregnancy. The active metabolite is also found in breast milk. Terbinafine Pregnancy And Lactation Text: This medication is Pregnancy Category B and is considered safe during pregnancy. It is also excreted in breast milk and breast feeding isn't recommended. Eucrisa Counseling: Patient may experience a mild burning sensation during topical application. Eucrisa is not approved in children less than 2 years of age. Bexarotene Pregnancy And Lactation Text: This medication is Pregnancy Category X and should not be given to women who are pregnant or may become pregnant. This medication should not be used if you are breast feeding. Erythromycin Pregnancy And Lactation Text: This medication is Pregnancy Category B and is considered safe during pregnancy. It is also excreted in breast milk. Clofazimine Counseling:  I discussed with the patient the risks of clofazimine including but not limited to skin and eye pigmentation, liver damage, nausea/vomiting, gastrointestinal bleeding and allergy. Otezla Pregnancy And Lactation Text: This medication is Pregnancy Category C and it isn't known if it is safe during pregnancy. It is unknown if it is excreted in breast milk. Sarecycline Counseling: Patient advised regarding possible photosensitivity and discoloration of the teeth, skin, lips, tongue and gums.  Patient instructed to avoid sunlight, if possible.  When exposed to sunlight, patients should wear protective clothing, sunglasses, and sunscreen.  The patient was instructed to call the office immediately if the following severe adverse effects occur:  hearing changes, easy bruising/bleeding, severe headache, or vision changes.  The patient verbalized understanding of the proper use and possible adverse effects of sarecycline.  All of the patient's questions and concerns were addressed. Stelara Counseling:  I discussed with the patient the risks of ustekinumab including but not limited to immunosuppression, malignancy, posterior leukoencephalopathy syndrome, and serious infections.  The patient understands that monitoring is required including a PPD at baseline and must alert us or the primary physician if symptoms of infection or other concerning signs are noted. Rituxan Pregnancy And Lactation Text: This medication is Pregnancy Category C and it isn't know if it is safe during pregnancy. It is unknown if this medication is excreted in breast milk but similar antibodies are known to be excreted. Griseofulvin Pregnancy And Lactation Text: This medication is Pregnancy Category X and is known to cause serious birth defects. It is unknown if this medication is excreted in breast milk but breast feeding should be avoided. Methotrexate Pregnancy And Lactation Text: This medication is Pregnancy Category X and is known to cause fetal harm. This medication is excreted in breast milk. Doxepin Pregnancy And Lactation Text: This medication is Pregnancy Category C and it isn't known if it is safe during pregnancy. It is also excreted in breast milk and breast feeding isn't recommended. Acitretin Pregnancy And Lactation Text: This medication is Pregnancy Category X and should not be given to women who are pregnant or may become pregnant in the future. This medication is excreted in breast milk. Cephalexin Pregnancy And Lactation Text: This medication is Pregnancy Category B and considered safe during pregnancy.  It is also excreted in breast milk but can be used safely for shorter doses. Albendazole Counseling:  I discussed with the patient the risks of albendazole including but not limited to cytopenia, kidney damage, nausea/vomiting and severe allergy.  The patient understands that this medication is being used in an off-label manner. Zyclara Counseling:  I discussed with the patient the risks of imiquimod including but not limited to erythema, scaling, itching, weeping, crusting, and pain.  Patient understands that the inflammatory response to imiquimod is variable from person to person and was educated regarded proper titration schedule.  If flu-like symptoms develop, patient knows to discontinue the medication and contact us. Hydroxychloroquine Pregnancy And Lactation Text: This medication has been shown to cause fetal harm but it isn't assigned a Pregnancy Risk Category. There are small amounts excreted in breast milk. Nsaids Counseling: NSAID Counseling: I discussed with the patient that NSAIDs should be taken with food. Prolonged use of NSAIDs can result in the development of stomach ulcers.  Patient advised to stop taking NSAIDs if abdominal pain occurs.  The patient verbalized understanding of the proper use and possible adverse effects of NSAIDs.  All of the patient's questions and concerns were addressed. Carac Counseling:  I discussed with the patient the risks of Carac including but not limited to erythema, scaling, itching, weeping, crusting, and pain. Cyclophosphamide Pregnancy And Lactation Text: This medication is Pregnancy Category D and it isn't considered safe during pregnancy. This medication is excreted in breast milk. Cellcept Counseling:  I discussed with the patient the risks of mycophenolate mofetil including but not limited to infection/immunosuppression, GI upset, hypokalemia, hypercholesterolemia, bone marrow suppression, lymphoproliferative disorders, malignancy, GI ulceration/bleed/perforation, colitis, interstitial lung disease, kidney failure, progressive multifocal leukoencephalopathy, and birth defects.  The patient understands that monitoring is required including a baseline creatinine and regular CBC testing. In addition, patient must alert us immediately if symptoms of infection or other concerning signs are noted. Valtrex Pregnancy And Lactation Text: this medication is Pregnancy Category B and is considered safe during pregnancy. This medication is not directly found in breast milk but it's metabolite acyclovir is present. Protopic Pregnancy And Lactation Text: This medication is Pregnancy Category C. It is unknown if this medication is excreted in breast milk when applied topically. Birth Control Pills Counseling: Birth Control Pill Counseling: I discussed with the patient the potential side effects of OCPs including but not limited to increased risk of stroke, heart attack, thrombophlebitis, deep venous thrombosis, hepatic adenomas, breast changes, GI upset, headaches, and depression.  The patient verbalized understanding of the proper use and possible adverse effects of OCPs. All of the patient's questions and concerns were addressed. Rifampin Counseling: I discussed with the patient the risks of rifampin including but not limited to liver damage, kidney damage, red-orange body fluids, nausea/vomiting and severe allergy. Clindamycin Counseling: I counseled the patient regarding use of clindamycin as an antibiotic for prophylactic and/or therapeutic purposes. Clindamycin is active against numerous classes of bacteria, including skin bacteria. Side effects may include nausea, diarrhea, gastrointestinal upset, rash, hives, yeast infections, and in rare cases, colitis. Xelshanicez Pregnancy And Lactation Text: This medication is Pregnancy Category D and is not considered safe during pregnancy.  The risk during breast feeding is also uncertain. Valtrex Counseling: I discussed with the patient the risks of valacyclovir including but not limited to kidney damage, nausea, vomiting and severe allergy.  The patient understands that if the infection seems to be worsening or is not improving, they are to call. Tazorac Pregnancy And Lactation Text: This medication is not safe during pregnancy. It is unknown if this medication is excreted in breast milk. Topical Sulfur Applications Pregnancy And Lactation Text: This medication is Pregnancy Category C and has an unknown safety profile during pregnancy. It is unknown if this topical medication is excreted in breast milk. Minocycline Counseling: Patient advised regarding possible photosensitivity and discoloration of the teeth, skin, lips, tongue and gums.  Patient instructed to avoid sunlight, if possible.  When exposed to sunlight, patients should wear protective clothing, sunglasses, and sunscreen.  The patient was instructed to call the office immediately if the following severe adverse effects occur:  hearing changes, easy bruising/bleeding, severe headache, or vision changes.  The patient verbalized understanding of the proper use and possible adverse effects of minocycline.  All of the patient's questions and concerns were addressed. Acitretin Counseling:  I discussed with the patient the risks of acitretin including but not limited to hair loss, dry lips/skin/eyes, liver damage, hyperlipidemia, depression/suicidal ideation, photosensitivity.  Serious rare side effects can include but are not limited to pancreatitis, pseudotumor cerebri, bony changes, clot formation/stroke/heart attack.  Patient understands that alcohol is contraindicated since it can result in liver toxicity and significantly prolong the elimination of the drug by many years. Solaraze Pregnancy And Lactation Text: This medication is Pregnancy Category B and is considered safe. There is some data to suggest avoiding during the third trimester. It is unknown if this medication is excreted in breast milk. Bactrim Counseling:  I discussed with the patient the risks of sulfa antibiotics including but not limited to GI upset, allergic reaction, drug rash, diarrhea, dizziness, photosensitivity, and yeast infections.  Rarely, more serious reactions can occur including but not limited to aplastic anemia, agranulocytosis, methemoglobinemia, blood dyscrasias, liver or kidney failure, lung infiltrates or desquamative/blistering drug rashes. Humira Counseling:  I discussed with the patient the risks of adalimumab including but not limited to myelosuppression, immunosuppression, autoimmune hepatitis, demyelinating diseases, lymphoma, and serious infections.  The patient understands that monitoring is required including a PPD at baseline and must alert us or the primary physician if symptoms of infection or other concerning signs are noted. Metronidazole Counseling:  I discussed with the patient the risks of metronidazole including but not limited to seizures, nausea/vomiting, a metallic taste in the mouth, nausea/vomiting and severe allergy. High Dose Vitamin A Pregnancy And Lactation Text: High dose vitamin A therapy is contraindicated during pregnancy and breast feeding. Methotrexate Counseling:  Patient counseled regarding adverse effects of methotrexate including but not limited to nausea, vomiting, abnormalities in liver function tests. Patients may develop mouth sores, rash, diarrhea, and abnormalities in blood counts. The patient understands that monitoring is required including LFT's and blood counts.  There is a rare possibility of scarring of the liver and lung problems that can occur when taking methotrexate. Persistent nausea, loss of appetite, pale stools, dark urine, cough, and shortness of breath should be reported immediately. Patient advised to discontinue methotrexate treatment at least three months before attempting to become pregnant.  I discussed the need for folate supplements while taking methotrexate.  These supplements can decrease side effects during methotrexate treatment. The patient verbalized understanding of the proper use and possible adverse effects of methotrexate.  All of the patient's questions and concerns were addressed. Metronidazole Pregnancy And Lactation Text: This medication is Pregnancy Category B and considered safe during pregnancy.  It is also excreted in breast milk.

## 2021-09-04 ENCOUNTER — HEALTH MAINTENANCE LETTER (OUTPATIENT)
Age: 66
End: 2021-09-04

## 2021-10-30 ENCOUNTER — HEALTH MAINTENANCE LETTER (OUTPATIENT)
Age: 66
End: 2021-10-30

## 2022-02-19 ENCOUNTER — HEALTH MAINTENANCE LETTER (OUTPATIENT)
Age: 67
End: 2022-02-19

## 2022-10-22 ENCOUNTER — HEALTH MAINTENANCE LETTER (OUTPATIENT)
Age: 67
End: 2022-10-22

## 2023-04-01 ENCOUNTER — HEALTH MAINTENANCE LETTER (OUTPATIENT)
Age: 68
End: 2023-04-01

## 2023-11-05 ENCOUNTER — HEALTH MAINTENANCE LETTER (OUTPATIENT)
Age: 68
End: 2023-11-05

## 2024-01-14 ENCOUNTER — HEALTH MAINTENANCE LETTER (OUTPATIENT)
Age: 69
End: 2024-01-14